# Patient Record
Sex: MALE | Race: BLACK OR AFRICAN AMERICAN | NOT HISPANIC OR LATINO | Employment: OTHER | URBAN - METROPOLITAN AREA
[De-identification: names, ages, dates, MRNs, and addresses within clinical notes are randomized per-mention and may not be internally consistent; named-entity substitution may affect disease eponyms.]

---

## 2023-04-25 LAB
BASOPHILS (10*3/UL) IN BLOOD BY AUTOMATED COUNT: 0 X10E9/L (ref 0–0.1)
BASOPHILS/100 LEUKOCYTES IN BLOOD BY AUTOMATED COUNT: 0 % (ref 0–2)
EOSINOPHILS (10*3/UL) IN BLOOD BY AUTOMATED COUNT: 0 X10E9/L (ref 0–0.4)
EOSINOPHILS/100 LEUKOCYTES IN BLOOD BY AUTOMATED COUNT: 0 % (ref 0–6)
ERYTHROCYTE DISTRIBUTION WIDTH (RATIO) BY AUTOMATED COUNT: 13.4 % (ref 11.5–14.5)
ERYTHROCYTE MEAN CORPUSCULAR HEMOGLOBIN CONCENTRATION (G/DL) BY AUTOMATED: 33.9 G/DL (ref 32–36)
ERYTHROCYTE MEAN CORPUSCULAR VOLUME (FL) BY AUTOMATED COUNT: 83 FL (ref 80–100)
ERYTHROCYTES (10*6/UL) IN BLOOD BY AUTOMATED COUNT: 5.07 X10E12/L (ref 4.5–5.9)
HEMATOCRIT (%) IN BLOOD BY AUTOMATED COUNT: 42.2 % (ref 41–52)
HEMOGLOBIN (G/DL) IN BLOOD: 14.3 G/DL (ref 13.5–17.5)
IGA (MG/DL) IN SER/PLAS: 284 MG/DL (ref 70–400)
IGG (MG/DL) IN SER/PLAS: 1270 MG/DL (ref 700–1600)
IGM (MG/DL) IN SER/PLAS: 66 MG/DL (ref 40–230)
IMMATURE GRANULOCYTES/100 LEUKOCYTES IN BLOOD BY AUTOMATED COUNT: 0.2 % (ref 0–0.9)
LEUKOCYTES (10*3/UL) IN BLOOD BY AUTOMATED COUNT: 5.7 X10E9/L (ref 4.4–11.3)
LYMPHOCYTES (10*3/UL) IN BLOOD BY AUTOMATED COUNT: 1.7 X10E9/L (ref 0.8–3)
LYMPHOCYTES/100 LEUKOCYTES IN BLOOD BY AUTOMATED COUNT: 29.7 % (ref 13–44)
MONOCYTES (10*3/UL) IN BLOOD BY AUTOMATED COUNT: 0.71 X10E9/L (ref 0.05–0.8)
MONOCYTES/100 LEUKOCYTES IN BLOOD BY AUTOMATED COUNT: 12.4 % (ref 2–10)
NEUTROPHILS (10*3/UL) IN BLOOD BY AUTOMATED COUNT: 3.3 X10E9/L (ref 1.6–5.5)
NEUTROPHILS/100 LEUKOCYTES IN BLOOD BY AUTOMATED COUNT: 57.7 % (ref 40–80)
NRBC (PER 100 WBCS) BY AUTOMATED COUNT: 0 /100 WBC (ref 0–0)
PLATELETS (10*3/UL) IN BLOOD AUTOMATED COUNT: 289 X10E9/L (ref 150–450)

## 2023-04-26 LAB
CD19 ABSOLUTE: 0.07 X10E9/L (ref 0.07–0.91)
CD19%: 4 % (ref 6–19)
CD19+CD24++CD38++%: 19 % (ref 1–3.6)
CD19+CD24-CD38++%: 0.9 % (ref 0.6–1.6)
CD19+CD27+IGD+%: 0.4 % (ref 13.4–21.4)
CD19+CD27+IGD-%: 1.3 % (ref 9.2–18.9)
CD19+CD27-IGD+%: 97.6 % (ref 58–72.1)
CD45%: 100 %
FMETH: ABNORMAL
FSIT1: ABNORMAL
MARKER INTERPRETATION: ABNORMAL
PV191: NORMAL

## 2023-09-11 DIAGNOSIS — D84.821 DRUG-INDUCED IMMUNODEFICIENCY (MULTI): Primary | ICD-10-CM

## 2023-09-11 DIAGNOSIS — Z79.899 DRUG-INDUCED IMMUNODEFICIENCY (MULTI): Primary | ICD-10-CM

## 2023-09-11 DIAGNOSIS — G35 MULTIPLE SCLEROSIS (MULTI): ICD-10-CM

## 2023-09-11 RX ORDER — FAMOTIDINE 10 MG/ML
20 INJECTION INTRAVENOUS ONCE AS NEEDED
Status: CANCELLED | OUTPATIENT
Start: 2023-10-24

## 2023-09-11 RX ORDER — EPINEPHRINE 0.3 MG/.3ML
0.3 INJECTION SUBCUTANEOUS EVERY 5 MIN PRN
Status: CANCELLED | OUTPATIENT
Start: 2023-10-24

## 2023-09-11 RX ORDER — DIPHENHYDRAMINE HYDROCHLORIDE 50 MG/ML
50 INJECTION INTRAMUSCULAR; INTRAVENOUS ONCE
Status: CANCELLED | OUTPATIENT
Start: 2023-10-24

## 2023-09-11 RX ORDER — DIPHENHYDRAMINE HYDROCHLORIDE 50 MG/ML
50 INJECTION INTRAMUSCULAR; INTRAVENOUS AS NEEDED
Status: CANCELLED | OUTPATIENT
Start: 2023-10-24

## 2023-09-11 RX ORDER — ALBUTEROL SULFATE 0.83 MG/ML
3 SOLUTION RESPIRATORY (INHALATION) AS NEEDED
Status: CANCELLED | OUTPATIENT
Start: 2023-10-24

## 2023-09-11 RX ORDER — ACETAMINOPHEN 325 MG/1
650 TABLET ORAL ONCE
Status: CANCELLED | OUTPATIENT
Start: 2023-10-24

## 2023-09-11 NOTE — PROGRESS NOTES
John Peterson's disease modifying therapy (DMT) orders transcribed from [facility] into EPIC therapy plan for patient's upcoming dose.    Medication: Ocrelizumab 600 mg IV every 6 months    Prescriber: Joon Treadwell MD    Infusion Location: [facility] ([facility])    Last Infusion: 4/25/2023    Next Infusion Due:  10/22/2023    Next Infusion Scheduled: 10/24/2023    Insurance Status: No precert required    Other: n/a    Abhijeet Llamas, PharmD, BCPS, MSCS  Clinical Pharmacy Specialist- Neurology/MS  [facility]  Phone: [phone]  Fax: [phone]

## 2023-09-26 PROBLEM — R32 URINARY INCONTINENCE: Status: ACTIVE | Noted: 2023-09-26

## 2023-09-26 PROBLEM — R52: Status: ACTIVE | Noted: 2023-09-26

## 2023-09-26 PROBLEM — R29.3 POSTURAL INSTABILITY: Status: ACTIVE | Noted: 2023-09-26

## 2023-09-26 PROBLEM — E78.5 HLD (HYPERLIPIDEMIA): Status: ACTIVE | Noted: 2023-09-26

## 2023-09-26 PROBLEM — R29.898 WEAKNESS OF LEFT LOWER EXTREMITY: Status: ACTIVE | Noted: 2023-09-26

## 2023-09-26 PROBLEM — R27.8 TRUNCAL ATAXIA: Status: ACTIVE | Noted: 2023-09-26

## 2023-09-26 PROBLEM — R43.8 DECREASED SENSE OF SMELL: Status: ACTIVE | Noted: 2023-09-26

## 2023-09-26 PROBLEM — R43.9 SMELL DISTURBANCE: Status: ACTIVE | Noted: 2023-09-26

## 2023-09-26 PROBLEM — G20.C PARKINSONISM (MULTI): Status: ACTIVE | Noted: 2023-09-26

## 2023-09-26 PROBLEM — G31.9 CEREBELLAR ATROPHY (CMS-HCC): Status: ACTIVE | Noted: 2023-09-26

## 2023-09-26 PROBLEM — R14.0 ABDOMINAL DISTENSION: Status: ACTIVE | Noted: 2023-09-26

## 2023-09-26 PROBLEM — H54.3 BLINDNESS, BILATERAL: Status: ACTIVE | Noted: 2023-09-26

## 2023-09-26 PROBLEM — H35.52 RETINITIS PIGMENTOSA: Status: ACTIVE | Noted: 2023-09-26

## 2023-09-26 PROBLEM — G95.9 SPINAL CORD LESION (MULTI): Status: ACTIVE | Noted: 2023-09-26

## 2023-09-26 PROBLEM — R09.A2 FOREIGN BODY SENSATION IN THROAT: Status: ACTIVE | Noted: 2023-09-26

## 2023-09-26 PROBLEM — J39.2 THROAT DRYNESS: Status: ACTIVE | Noted: 2023-09-26

## 2023-09-26 PROBLEM — M79.18 PAIN OF PARASPINAL MUSCLE: Status: ACTIVE | Noted: 2023-09-26

## 2023-09-26 PROBLEM — H40.1190 PRIMARY OPEN ANGLE GLAUCOMA: Status: ACTIVE | Noted: 2023-09-26

## 2023-09-26 PROBLEM — I10 HYPERTENSION: Status: ACTIVE | Noted: 2023-09-26

## 2023-09-26 PROBLEM — R09.82 POSTNASAL DRIP: Status: ACTIVE | Noted: 2023-09-26

## 2023-09-26 RX ORDER — CHOLECALCIFEROL (VITAMIN D3) 125 MCG
125 CAPSULE ORAL DAILY
COMMUNITY
Start: 2023-07-11 | End: 2024-01-24

## 2023-09-26 RX ORDER — BACLOFEN 10 MG/1
TABLET ORAL
COMMUNITY
Start: 2020-08-26 | End: 2024-06-03 | Stop reason: WASHOUT

## 2023-09-26 RX ORDER — SIMETHICONE 125 MG
TABLET,CHEWABLE ORAL
COMMUNITY
Start: 2021-04-01 | End: 2024-06-03 | Stop reason: WASHOUT

## 2023-09-26 RX ORDER — CHLORHEXIDINE GLUCONATE ORAL RINSE 1.2 MG/ML
SOLUTION DENTAL
COMMUNITY
Start: 2023-07-31 | End: 2023-11-02 | Stop reason: ALTCHOICE

## 2023-09-26 RX ORDER — ERGOCALCIFEROL 1.25 MG/1
1 CAPSULE ORAL
COMMUNITY
End: 2024-06-03 | Stop reason: WASHOUT

## 2023-09-26 RX ORDER — PRAVASTATIN SODIUM 20 MG/1
1 TABLET ORAL NIGHTLY
COMMUNITY
Start: 2020-09-30 | End: 2024-04-02

## 2023-09-26 RX ORDER — OMEPRAZOLE 20 MG/1
20 CAPSULE, DELAYED RELEASE ORAL
COMMUNITY
Start: 2023-04-03 | End: 2024-04-02

## 2023-09-26 RX ORDER — AMLODIPINE BESYLATE 10 MG/1
10 TABLET ORAL
COMMUNITY
Start: 2023-04-03 | End: 2024-04-02

## 2023-09-26 RX ORDER — HYDROCHLOROTHIAZIDE 12.5 MG/1
1 TABLET ORAL DAILY
COMMUNITY
Start: 2021-02-24

## 2023-09-26 RX ORDER — AMOXICILLIN 500 MG/1
CAPSULE ORAL
COMMUNITY
Start: 2023-07-31 | End: 2023-11-02 | Stop reason: ALTCHOICE

## 2023-09-26 RX ORDER — OCRELIZUMAB 300 MG/10ML
INJECTION INTRAVENOUS
COMMUNITY
Start: 2020-02-27 | End: 2023-09-28 | Stop reason: WASHOUT

## 2023-09-26 RX ORDER — CARBIDOPA AND LEVODOPA 25; 100 MG/1; MG/1
TABLET ORAL
COMMUNITY
End: 2023-11-02 | Stop reason: SDUPTHER

## 2023-09-26 RX ORDER — IBUPROFEN 800 MG/1
800 TABLET ORAL EVERY 8 HOURS PRN
COMMUNITY
Start: 2023-07-31

## 2023-09-26 RX ORDER — OXYBUTYNIN CHLORIDE 5 MG/1
TABLET ORAL
COMMUNITY
Start: 2016-03-08

## 2023-09-28 DIAGNOSIS — G35 MULTIPLE SCLEROSIS (MULTI): Primary | ICD-10-CM

## 2023-10-02 DIAGNOSIS — D84.821 DRUG-INDUCED IMMUNODEFICIENCY (MULTI): ICD-10-CM

## 2023-10-02 DIAGNOSIS — G35 MULTIPLE SCLEROSIS (MULTI): Primary | ICD-10-CM

## 2023-10-02 DIAGNOSIS — Z79.899 DRUG-INDUCED IMMUNODEFICIENCY (MULTI): ICD-10-CM

## 2023-10-12 ENCOUNTER — APPOINTMENT (OUTPATIENT)
Dept: NEUROLOGY | Facility: CLINIC | Age: 78
End: 2023-10-12
Payer: MEDICARE

## 2023-10-24 ENCOUNTER — INFUSION (OUTPATIENT)
Dept: INFUSION THERAPY | Facility: CLINIC | Age: 78
End: 2023-10-24
Payer: MEDICARE

## 2023-10-24 VITALS
HEART RATE: 82 BPM | TEMPERATURE: 98.1 F | SYSTOLIC BLOOD PRESSURE: 142 MMHG | DIASTOLIC BLOOD PRESSURE: 80 MMHG | RESPIRATION RATE: 16 BRPM | OXYGEN SATURATION: 96 %

## 2023-10-24 DIAGNOSIS — D84.821 DRUG-INDUCED IMMUNODEFICIENCY (MULTI): ICD-10-CM

## 2023-10-24 DIAGNOSIS — G35 MULTIPLE SCLEROSIS (MULTI): ICD-10-CM

## 2023-10-24 DIAGNOSIS — Z79.899 DRUG-INDUCED IMMUNODEFICIENCY (MULTI): ICD-10-CM

## 2023-10-24 LAB
BASOPHILS # BLD AUTO: 0 X10*3/UL (ref 0–0.1)
BASOPHILS NFR BLD AUTO: 0 %
EOSINOPHIL # BLD AUTO: 0 X10*3/UL (ref 0–0.4)
EOSINOPHIL NFR BLD AUTO: 0 %
ERYTHROCYTE [DISTWIDTH] IN BLOOD BY AUTOMATED COUNT: 13.4 % (ref 11.5–14.5)
HCT VFR BLD AUTO: 40.8 % (ref 41–52)
HGB BLD-MCNC: 13.3 G/DL (ref 13.5–17.5)
IGA SERPL-MCNC: 280 MG/DL (ref 70–400)
IGG SERPL-MCNC: 1430 MG/DL (ref 700–1600)
IGM SERPL-MCNC: 35 MG/DL (ref 40–230)
IMM GRANULOCYTES # BLD AUTO: 0.01 X10*3/UL (ref 0–0.5)
IMM GRANULOCYTES NFR BLD AUTO: 0.2 % (ref 0–0.9)
LYMPHOCYTES # BLD AUTO: 1.89 X10*3/UL (ref 0.8–3)
LYMPHOCYTES NFR BLD AUTO: 35.8 %
MCH RBC QN AUTO: 28.1 PG (ref 26–34)
MCHC RBC AUTO-ENTMCNC: 32.6 G/DL (ref 32–36)
MCV RBC AUTO: 86 FL (ref 80–100)
MONOCYTES # BLD AUTO: 0.7 X10*3/UL (ref 0.05–0.8)
MONOCYTES NFR BLD AUTO: 13.3 %
NEUTROPHILS # BLD AUTO: 2.68 X10*3/UL (ref 1.6–5.5)
NEUTROPHILS NFR BLD AUTO: 50.7 %
NRBC BLD-RTO: 0 /100 WBCS (ref 0–0)
PLATELET # BLD AUTO: 288 X10*3/UL (ref 150–450)
PMV BLD AUTO: 10.2 FL (ref 7.5–11.5)
RBC # BLD AUTO: 4.74 X10*6/UL (ref 4.5–5.9)
WBC # BLD AUTO: 5.3 X10*3/UL (ref 4.4–11.3)

## 2023-10-24 PROCEDURE — 96415 CHEMO IV INFUSION ADDL HR: CPT | Performed by: NURSE PRACTITIONER

## 2023-10-24 PROCEDURE — 36415 COLL VENOUS BLD VENIPUNCTURE: CPT

## 2023-10-24 PROCEDURE — 96413 CHEMO IV INFUSION 1 HR: CPT | Performed by: NURSE PRACTITIONER

## 2023-10-24 PROCEDURE — 85025 COMPLETE CBC W/AUTO DIFF WBC: CPT

## 2023-10-24 PROCEDURE — 82784 ASSAY IGA/IGD/IGG/IGM EACH: CPT

## 2023-10-24 PROCEDURE — 96375 TX/PRO/DX INJ NEW DRUG ADDON: CPT | Performed by: NURSE PRACTITIONER

## 2023-10-24 RX ORDER — ACETAMINOPHEN 325 MG/1
650 TABLET ORAL ONCE
Status: CANCELLED | OUTPATIENT
Start: 2023-10-24

## 2023-10-24 RX ORDER — DIPHENHYDRAMINE HYDROCHLORIDE 50 MG/ML
50 INJECTION INTRAMUSCULAR; INTRAVENOUS ONCE
Status: CANCELLED | OUTPATIENT
Start: 2023-10-24

## 2023-10-24 RX ORDER — DIPHENHYDRAMINE HYDROCHLORIDE 50 MG/ML
50 INJECTION INTRAMUSCULAR; INTRAVENOUS AS NEEDED
Status: CANCELLED | OUTPATIENT
Start: 2023-10-24

## 2023-10-24 RX ORDER — ACETAMINOPHEN 325 MG/1
650 TABLET ORAL ONCE
Status: COMPLETED | OUTPATIENT
Start: 2023-10-24 | End: 2023-10-24

## 2023-10-24 RX ORDER — DIPHENHYDRAMINE HYDROCHLORIDE 50 MG/ML
50 INJECTION INTRAMUSCULAR; INTRAVENOUS ONCE
Status: COMPLETED | OUTPATIENT
Start: 2023-10-24 | End: 2023-10-24

## 2023-10-24 RX ORDER — EPINEPHRINE 0.3 MG/.3ML
0.3 INJECTION SUBCUTANEOUS EVERY 5 MIN PRN
Status: CANCELLED | OUTPATIENT
Start: 2023-10-24

## 2023-10-24 RX ORDER — FAMOTIDINE 10 MG/ML
20 INJECTION INTRAVENOUS ONCE AS NEEDED
Status: CANCELLED | OUTPATIENT
Start: 2023-10-24

## 2023-10-24 RX ORDER — ALBUTEROL SULFATE 0.83 MG/ML
3 SOLUTION RESPIRATORY (INHALATION) AS NEEDED
Status: CANCELLED | OUTPATIENT
Start: 2023-10-24

## 2023-10-24 RX ADMIN — DIPHENHYDRAMINE HYDROCHLORIDE 50 MG: 50 INJECTION INTRAMUSCULAR; INTRAVENOUS at 10:59

## 2023-10-24 RX ADMIN — ACETAMINOPHEN 650 MG: 325 TABLET ORAL at 10:59

## 2023-10-24 ASSESSMENT — ENCOUNTER SYMPTOMS
NERVOUS/ANXIOUS: 0
HEMATOLOGIC/LYMPHATIC NEGATIVE: 1
APPETITE CHANGE: 1
LIGHT-HEADEDNESS: 1
CONFUSION: 0
VOMITING: 0
RESPIRATORY NEGATIVE: 1
PALPITATIONS: 1
DEPRESSION: 0
EYE PROBLEMS: 1
BACK PAIN: 1
NAUSEA: 0
DIZZINESS: 1
ABDOMINAL PAIN: 1

## 2023-10-24 NOTE — PROGRESS NOTES
[facility]   infusion Clinic Note   Date: 2023   Name: John Peterson  : 1945   MRN: [id number]         Reason for Visit: OP Infusion (HERE FOR Q6 MONTH OCREVUS 600 MG INFUSION.)       Visit Type: INFUSION     Ordered By: DR. LEWIS    Accompanied by:Self. BROUGHT INTO TREATMENT ROOM BY HOME HELPER (PER PT)      Diagnosis: Multiple sclerosis (CMS/HCC)    Drug-induced immunodeficiency (CMS/HCC)      Allergies:   Allergies as of 10/24/2023    (No Known Allergies)        Current Medications has a current medication list which includes the following prescription(s): amlodipine, amoxicillin, cholecalciferol, ergocalciferol, hydrochlorothiazide, ibuprofen, ocrelizumab, omeprazole, oxybutynin, pravastatin, baclofen, benzalkonium chloride, carbidopa-levodopa, chlorhexidine, ocrelizumab, and simethicone.          Vitals:  Vitals:    10/24/23 1140 10/24/23 1240 10/24/23 1340 10/24/23 1445   BP: 143/75 145/78 142/79 171/74   Pulse: 67 79 74 108   Resp: 16 16 16 16   Temp: 36.6 °C (97.9 °F) 36.7 °C (98 °F) 36.3 °C (97.3 °F) 36.7 °C (98.1 °F)   SpO2: 96% 98% 96% 96%          Infusion Pre-procedure Checklist:   Allergies reviewed: yes   Medications reviewed: yes     Previous reaction to current treatment: No      Assess patient for the concerns below. Document provider notification as appropriate.  - Active or recent infection with/without current antibiotic use: no  - Recent or planned invasive dental work: YES. PT STATES HE HAD A TOOTH REMOVED 1 MONTH AGO.   - Recent or planned surgeries: no  - Recently received or plans to receive vaccinations: no  - Has treatment related toxicities: no  - Is pregnant:  n/a    - Does the patient meet criteria to treat? Yes    Provider notified? No      Pain: 0    Is the pain different from normal: no   Is the pain tolerable: n/a   Is your Doctor aware: yes       Labs: Pending CBC, BCELL, IGG DRAWN TODAY. 10/24/23.      Fall Risk Screening: Limon Fall  "Risk  History of Falling, Immediate or Within 3 Months: No  Secondary Diagnosis: No  Ambulatory Aid: Crutches/cane/walker  Intravenous Therapy/Heparin Lock: No  Gait/Transferring: Weak  Mental Status: Oriented to own ability  Limon Fall Risk Score: 25       Review of Systems   Constitutional:  Positive for appetite change.        PT STATES HE FEELS FULL QUICKLY.    HENT:   Positive for hearing loss and tinnitus.         JEREMIE HEARING LOSS, PT STATES L EAR IS WORSE.    Eyes:  Positive for eye problems.        BLIND IN BOTH EYES.    Respiratory: Negative.     Cardiovascular:  Positive for palpitations.        PT STATES HE SOMETIMES FEELS \" ITCHING INSIDE OF CHEST\".   Gastrointestinal:  Positive for abdominal pain. Negative for nausea and vomiting.        PT ADMITS TO INTERMITTENT STOMACH PAIN. STATES HE FLUCTUATES BETWEEN DIARRHEA AND CONSTIPATION. DENIES N/V.   Genitourinary:          ADMITS TO URGENCY WITH URINATION.    Musculoskeletal:  Positive for back pain.        PT STATES WHOLE BODY FEELS SORE. DENIES ANY SPECIFIC PAIN AT THE MOMENT. STATES HIS RIGHT SHOULDER AND L LEG ACHE.    Skin:  Positive for itching.        ADMITS TO ITCHY SKIN. SKIN VISIBLY DRY.    Neurological:  Positive for dizziness and light-headedness.        ADMITS TO DIZZINESS AND LIGHTHEADED WITH POSITION CHANGES.   N/T IN LETS AND FEET AT TIMES.    Hematological: Negative.    Psychiatric/Behavioral:  Negative for confusion and depression. The patient is not nervous/anxious.          Infusion Readiness:   Assessment Concerns Related to Infusion: No  Provider notified: no      Document Below Only If Indicated:   New Patient Education:  N/A (returning patient for continuation of therapy. Ongoing education provided as needed.)       Drug Specific Questions:  Ocrelizumab  (OCREVUS)  ANY S/S (PML) MEMORY LOSS, TROUBLE THINKING, DIZZINESS, LOSS OF BALANCE, DIFFICULTY TALKING OR WALKING, LOSS OF VISION?  DENIES      Weight Based Drug " Calculations:  WEIGHT BASED DRUGS: NOT APPLICABLE           Initiated By: Estela King RN   Time: 3:37 PM     We administered acetaminophen, methylPREDNISolone sod succinate (PF), diphenhydrAMINE, and ocrelizumab (Ocrevus) 600 mg in sodium chloride 0.9% 520 mL IV.

## 2023-10-24 NOTE — PATIENT INSTRUCTIONS
Today you received: OCREVUS 600 MG     NEXT APPT: 6 MONTHS    For:   1. Multiple sclerosis (CMS/[facility])    2. Drug-induced immunodeficiency (CMS/[facility])          Please read the  Medication Guide that was given to you and reviewed during todays visit.     (Tell all doctors including dentists that you are taking this medication)     Go to the emergency room or call 911 if:  -You have signs of allergic reaction:   o         Rash, hives, itching.   o         Swollen, blistered, peeling skin.   o         Swelling of face, lips, mouth, tongue or throat.   o         Tightness of chest, trouble breathing, swallowing or talking      Call your doctor:     - If IV / injection site gets red, warm, swollen, itchy or leaks fluid or pus.     (Leave dressing on your IV site for at least 2 hours and keep area clean and dry  - If you get sick or have symptoms of infection or are not feeling well for any reason.    (Wash your hands often, stay away from people who are sick)  - If you have side effects from your medication that do not go away or are bothersome.     (Refer to the teaching your nurse gave you for side effects to call your doctor about)     Common side effects may include:  stuffy nose, headache, feeling tired, muscle aches, upset stomach  - Before receiving any vaccines, Call the [facility] at   if:  - You get sick, are on antibiotics, have had a recent vaccine, have surgery or dental work and your doctor wants your visit rescheduled.  - You need to cancel and reschedule your visit for any reason. Call at least 2 days before your visit if you need to cancel.   - Your insurance changes before your next visit.    (We will need to get approval from your new insurance. This can take up to two weeks.)     The [facility] is opened Monday thru Friday. We are closed on weekends and holidays.     Voice mail will take your call if the center is closed. If you leave a message please allow 24 hours for a  call back during weekdays. If you leave a message on a weekend/holiday, we will call you back the next business day.

## 2023-11-02 ENCOUNTER — OFFICE VISIT (OUTPATIENT)
Dept: NEUROLOGY | Facility: HOSPITAL | Age: 78
End: 2023-11-02
Payer: MEDICARE

## 2023-11-02 VITALS
DIASTOLIC BLOOD PRESSURE: 72 MMHG | BODY MASS INDEX: 26.55 KG/M2 | WEIGHT: 196 LBS | SYSTOLIC BLOOD PRESSURE: 135 MMHG | TEMPERATURE: 97.1 F | RESPIRATION RATE: 18 BRPM | HEIGHT: 72 IN | HEART RATE: 60 BPM

## 2023-11-02 DIAGNOSIS — G35 MULTIPLE SCLEROSIS (MULTI): Primary | ICD-10-CM

## 2023-11-02 PROCEDURE — 1159F MED LIST DOCD IN RCRD: CPT | Performed by: PSYCHIATRY & NEUROLOGY

## 2023-11-02 PROCEDURE — 3078F DIAST BP <80 MM HG: CPT | Performed by: PSYCHIATRY & NEUROLOGY

## 2023-11-02 PROCEDURE — 1036F TOBACCO NON-USER: CPT | Performed by: PSYCHIATRY & NEUROLOGY

## 2023-11-02 PROCEDURE — 1126F AMNT PAIN NOTED NONE PRSNT: CPT | Performed by: PSYCHIATRY & NEUROLOGY

## 2023-11-02 PROCEDURE — 3075F SYST BP GE 130 - 139MM HG: CPT | Performed by: PSYCHIATRY & NEUROLOGY

## 2023-11-02 PROCEDURE — 99214 OFFICE O/P EST MOD 30 MIN: CPT | Performed by: PSYCHIATRY & NEUROLOGY

## 2023-11-02 RX ORDER — CARBIDOPA AND LEVODOPA 25; 100 MG/1; MG/1
1.5 TABLET ORAL 3 TIMES DAILY
Qty: 405 TABLET | Refills: 3 | Status: SHIPPED | OUTPATIENT
Start: 2023-11-02 | End: 2024-04-22

## 2023-11-02 ASSESSMENT — PAIN SCALES - GENERAL: PAINLEVEL: 0-NO PAIN

## 2023-11-02 NOTE — PROGRESS NOTES
Diagnoses/Problems  Assessed    · Multiple sclerosis (340) (G35)   · Parkinsonism (332.0) (G20)        Chief Complaint  PD and MS.      History of Present Illness          Provider: Joon Treadwell         Patient name: DEONTE WISE   DMITRIYB: Sep 8 1945   PCP: none      Diagnosis if known: SPMS + parkinsonism   Date of onset: 2015  Date of diagnosis: 08/22/2019  disease course at onset: relapsing  disease course now: progressive without relapses   Current DMT: Ocrevus as of 9/2019  Previous DMTs: none   Last MRI brain: 6/2023  Last MRI cervical: 6/2019  Last MRI thoracic: 6/2019  Last OCT: not done  CSF: cells normal, P 53, positive OCBs  JCV: negative, index 0.2, August 2019  VZV: positive 8/2019  HEP panel: negative 8/2019  NMO: negative July 2019  MOG: negative July 2019        This is a 75 yo right handed AAM with hx of retinitis pigmentosa (legally blind), HTN, and HPL who presents for follow up regarding mild parkinsonism and SPMS     Interval hx:  Reports worsening of slow gait with the walker. Thinks that sinemet is helping some and is open to a higher dose. Reports some tightness in the stomach. MRI from June stable and labs are good.         PD symptom review:      He is a right-handed 76 year-old man with difficulty walking and a tremor in the left arm.   Tremor at rest and postural instability, but no rigidity of movement, no loss of postural reflexes and no frequent falls.   He experiences no noticeable benefit from the medications.  By report, there is good compliance with treatment, good tolerance of treatment and poor symptom control.   Previous medications included: levodopa-carbidopa (Sinemet).    He reports balance problems, but no falls.   He reports no speech problems and no dysphagia.   He reports no constipation . Has chronic increased urgency and frequency.   He reports he has no symptoms of depression and no anxiety.   He reports no difficulty falling asleep, no difficulty staying asleep,  no acting out of dreams and no restless legs . Goes to bed at 10am, up at 12/1am due to having to urinate, then up again at 4am. has vivid dreaming.   He reports no concern about memory, no hallucinations and no delusions . Does not drive due to vision impairment (had to retire in 2007 due to vision). He does not drive   He reports no recent physical therapy and no recent occupational therapy. He exercises.       Review of Systems  All systems reviewed and are negative except as mentioned in the HPI.        Active Problems  Problems    · Abdominal distension (787.3) (R14.0)   · Blindness, bilateral (369.00) (H54.3)   · Couldn't count fingers in either eye.   · Cerebellar atrophy (331.9) (G31.9)   · Decreased sense of smell (781.1) (R43.8)   · Foreign body sensation in throat (784.99) (R09.89)   · HLD (hyperlipidemia) (272.4) (E78.5)   · Hypertension (401.9) (I10)   · Left leg pain (729.5) (M79.605)   · Left shoulder pain (719.41) (M25.512)   · Multiple sclerosis (340) (G35)   · Need for hepatitis B screening test (V73.89) (Z11.59)   · Pain of paraspinal muscle (729.1) (M79.18)   · Parkinsonism (332.0) (G20)   · Postnasal drip (784.91) (R09.82)   · Postural instability (729.90) (R29.3)   · Primary open angle glaucoma (365.11,365.70) (H40.1190)   · Added by Problem List Migration; 2013-12-20   · Retinitis pigmentosa (362.74) (H35.52)   · Diagnosed in 2007.   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   · History of glaucoma (V12.49) (Z86.69)   · History of Need for hepatitis C screening test (V73.89) (Z11.59)   · Resolved Date: 04 Mar 2021   ·  History of Prostate disease (602.9) (N42.9)   · ?Cancer, s/p ?some cryotherapy 2014 ([facility])   · History of Senile nuclear cataract (366.16) (H25.10)   · Added by Problem List Migration; 2013-12-20     Surgical History  Problems    · History of Hernia repair     Family History  Mother    · No pertinent family history  Father    · No pertinent family history  Child    · No pertinent family history  Sibling    · No pertinent family history     Social History  Problems    · Activities of daily living (ADL's), independent   · Lives in a house by himself; able to do thigns at home by himself; Doesn't drive since      2007 due to vision impairment from RP; Brother takes him to the Grocer[employer] store; he can      cook by himself though at home.   · No alcohol use   · Non-smoker (V49.89) (Z78.9)   · Retired   · Was a Dyemaker; last worked in 2007     Allergies  Medication    · No Known Drug Allergies   Recorded By: Rosa Zimmerman; 12/2/2015 11:51:29 AM          Physical Exam  oriented in time, place and person   bilateral severe visual impairment  rest tremor on the left hand , worsened by distraction   bradykinesia R> L   muscle tone: rigidity on activation maneuver   He is barely able to stand without support   strength 4+ grossly on the right , 4- on the left      Scores and Scales     Pyramidal Functions: (2) Minimal disability.   Cerebellar Functions: (3) Moderate truncal or limb ataxia (tremor or clumsy movements interfere with function in all spheres).   Brainstem Functions: (0) Normal.      Sensory Function: (1) Vibration or figure-writing decrease only in one or two limb.                     Bowel and Bladder Function: (1) Mild urinary hesitance, urgency or retention.      Visual Function: (6) Loss of bowel and bladder function.         Cerebral (or Mental) Functions: (0) Normal. 7.5 - Unable to take more than a few steps; restricted to wheelchair; may need aid in transfer; wheels self but cannot carry on in  standard wheelchair a full day; May require motorized wheelchair        Provider Impressions  This is a 74 yo right handed AAM with hx of retinitis pigmentosa (legally blind), HTN, and HPL who presents with difficulty walking that started somewhat acutely in 2015 with LLE weakness followed by progressive? gait decline and resting tremor of the LUE. Exam shows evidence of retinitis pigmentosa on fundus photos, mild hypomimia, mild resting tremor and bradykinesia L>R, 4-/5 LLE weakness, and truncal ataxia. He was admitted to [facility] in 2015 after his initial fall and brain MRI showed no acute findings but was positive for impressive cerebellar atrophy per report. 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 He did not respond to a sufficient trial of sinemet. The combination of retinitis pigmentosa, parkinsonism, cerebellar ataxia/atrophy is suspicious for a heredofamilial condition although the abrupt onset and the patient's advanced age are atypical. Although focal limb weakness can occur in some heredofamilial conditions secondary to amyotrophy, the acute onset of the weakness is definitely atypical and requires more in-depth workup. We will start by MRI of the brain and spinal cord WWO. The DD includes one or more of the following conditions: Atypical SCA or other heredofamilial conditions, Atypical parkinsonism, and cerebrovascular or myelopathic process.     07/01/2019: MRI of the spine showed a non-enhancing T2 hyperintense lesion from T4 to T6 concerning for demyelination. Brain MRI shows mainly non-specific lesions in the deep white matter but it does show a few true periventricular and callosal lesions in perpendicular orientation. Although the same thoracic lesion was read previously as a syrinx at [facility], the fact that he had acute LLE weakness as well as the shape of the cord lesion and the findings of brain MRI are all suspicious for a demyelinating process.  Retinitis pigmentosa has also been linked to optic neuritis in a few cases. Overall, the patient needs full myelopathy and demyelination workup at this point.      07/22/2019: All MS mimics came back negative including negative AQP4 and MOG. Will need an LP for MS rule out.      08/22/2019: CSF came back positive for two or more OCBs. No serum sample submitted so it is unknown if the OCBs are restricted to the CSF, however, since there is low suspicion for a systemic inflammatory disease and since the main differentiation was between spinal demyelination versus syrinx, the positive OCBs here can be interpreted in support of MS. Patient feels that his left leg is getting weaker with time. He is likely in the secondary progressive phase of the disease. Given recent relapse in 2015 and ongoing worsening of leg strength, I prefer to start him on ocrevus for active SPMS. He wants to think about it first.      10/17/2019: Started ocrevus in August with good tolerability. He feels that his leg has gotten heavier after ocrevus but no new symptoms. LLE progressive weakness likely represents disease progression rather than ocrevus side effects. I offered him empiric steroids but he's not interested in this.      01/21/2020: Still feels that his legs are getting heavier and is wondering if ocrevus is causing this. He is questioning whether he should continue to be on ocrevus but I encouraged him to continue taking it to slow down disease progression. He has body pains and skin sensitivity that keeps him from sleeping so I will try him on some gabapentin. Constipation is becoming a bothersome problem so I'll try him on miralax.      03/17/2020: skin sensitivity and constipation improved without having to take gabapentin or miralax. Has not done MRI yet.      04/27/2020 still hasn't done MRI. LLE still getting weaker. He thinks that Ocrevus is causing unpleasant body smell. wants to try sinemet again for her LUE tremor. will  restart sinemet. I encouraged him to continue ocrevus despite the continued progression as ocrevus may still have benefit.      08/26/2020: No benefit from sinemet on tremor or leg stiffness which is getting worse. I will add baclofen and send ocrevus labs.      11/03/2020: tremor and anosmia worsening. still does not want to retry sinemet. ocrevus labs normal. overdue for MRI.      01/18/2021: New brain MRI without new or active lesions. right leg feels heavy. back and legs feel itchy and scratchy. will try him on a small dose of gabapentin (previously had nausea with it).      06/01/2021: gabapentin did not help his itching. legs feel shaky and his balance is getting worse but still unwilling to try sinemet again. No weakness or spasticity of the BLE on exam so unlikely related to MS progression. Labs from Feb unremarkable with lymphopenia or hypogammaglobulinemia. no infections. completed both doses of the Pfizer COVID19 vaccine in March.         9/30/21: Still complains of tremors. He is willing to restart the sinemet. labs show 2% CD19 count in February and persistent lymphopenia at 0.8. Normal Ig levels. He did not mount a humoral response to the COVID19 vaccine and I recommended he takes a booster dose.      11/2/2023: Reports worsening of slow gait with the walker. Thinks that sinemet is helping some and is open to a higher dose. Reports some tightness in the stomach. MRI from June stable and labs are good. Will increase sinemet and continue  ocrevus.      PLAN:  - DMT: continue ocrevus.   - ocrevus labs to be done with each infusion.   - Acute relapse management: not indicated but we can try empiric steroids for him in the future if he agrees.   - Symptomatic: will increase sinemet to 1.5 tabs TID.   - Vitamin D: Continue vitamin D3 51469 UNITS weekly  - Will order brain MRI WWO to be done in June of 2024  - PT/OT: continue PT.   - Follow up in June of 2024 with me or Mari.      The impression and plan  were discussed with the patient and their family (if present) in detail and all questions answered. They agreed to the plan as outlined above.

## 2023-11-02 NOTE — PATIENT INSTRUCTIONS
Your MRI from June was stable without new or active MS lesions, which is great.     Your blood work from last week was also good.     I will increase your carbidopa/levodopa to 1.5 tablet three times daily to see if it will help some of your symptoms.     Please continue the same doses of baclofen and vitamin D.     Repeat brain MRI in June of 2024 then follow up again with me or Mari.     Thank you visiting the clinic today    Joon Treadwell MD

## 2024-01-23 DIAGNOSIS — G35 MULTIPLE SCLEROSIS (MULTI): ICD-10-CM

## 2024-01-24 RX ORDER — CHOLECALCIFEROL (VITAMIN D3) 125 MCG
125 CAPSULE ORAL DAILY
Qty: 90 CAPSULE | Refills: 3 | Status: SHIPPED | OUTPATIENT
Start: 2024-01-24 | End: 2024-04-24 | Stop reason: SDUPTHER

## 2024-03-11 DIAGNOSIS — Z79.899 DRUG-INDUCED IMMUNODEFICIENCY (MULTI): ICD-10-CM

## 2024-03-11 DIAGNOSIS — D84.821 DRUG-INDUCED IMMUNODEFICIENCY (MULTI): ICD-10-CM

## 2024-03-11 DIAGNOSIS — G35 MULTIPLE SCLEROSIS (MULTI): Primary | ICD-10-CM

## 2024-03-11 RX ORDER — ALBUTEROL SULFATE 0.83 MG/ML
3 SOLUTION RESPIRATORY (INHALATION) AS NEEDED
Status: CANCELLED | OUTPATIENT
Start: 2024-04-25

## 2024-03-11 RX ORDER — DIPHENHYDRAMINE HYDROCHLORIDE 50 MG/ML
50 INJECTION INTRAMUSCULAR; INTRAVENOUS AS NEEDED
Status: CANCELLED | OUTPATIENT
Start: 2024-04-25

## 2024-03-11 RX ORDER — EPINEPHRINE 0.3 MG/.3ML
0.3 INJECTION SUBCUTANEOUS EVERY 5 MIN PRN
Status: CANCELLED | OUTPATIENT
Start: 2024-04-25

## 2024-03-11 RX ORDER — ACETAMINOPHEN 325 MG/1
650 TABLET ORAL ONCE
Status: CANCELLED | OUTPATIENT
Start: 2024-04-25

## 2024-03-11 RX ORDER — FAMOTIDINE 10 MG/ML
20 INJECTION INTRAVENOUS ONCE AS NEEDED
Status: CANCELLED | OUTPATIENT
Start: 2024-04-25

## 2024-03-11 RX ORDER — DIPHENHYDRAMINE HYDROCHLORIDE 50 MG/ML
25 INJECTION INTRAMUSCULAR; INTRAVENOUS ONCE
Status: CANCELLED | OUTPATIENT
Start: 2024-04-25

## 2024-04-18 DIAGNOSIS — G35 MULTIPLE SCLEROSIS (MULTI): ICD-10-CM

## 2024-04-22 RX ORDER — CARBIDOPA AND LEVODOPA 25; 100 MG/1; MG/1
TABLET ORAL
Qty: 270 TABLET | Refills: 2 | Status: SHIPPED | OUTPATIENT
Start: 2024-04-22

## 2024-04-24 ENCOUNTER — TELEPHONE (OUTPATIENT)
Dept: NEUROLOGY | Facility: HOSPITAL | Age: 79
End: 2024-04-24
Payer: MEDICARE

## 2024-04-24 DIAGNOSIS — G35 MULTIPLE SCLEROSIS (MULTI): ICD-10-CM

## 2024-04-24 RX ORDER — CHOLECALCIFEROL (VITAMIN D3) 125 MCG
125 CAPSULE ORAL DAILY
Qty: 90 CAPSULE | Refills: 3 | Status: SHIPPED | OUTPATIENT
Start: 2024-04-24

## 2024-04-24 NOTE — TELEPHONE ENCOUNTER
John Peterson called. He would like to request Vit-D be sent to [facility] on file. Please send plenty of refills.

## 2024-04-25 ENCOUNTER — INFUSION (OUTPATIENT)
Dept: INFUSION THERAPY | Facility: CLINIC | Age: 79
End: 2024-04-25
Payer: MEDICARE

## 2024-04-25 VITALS
DIASTOLIC BLOOD PRESSURE: 76 MMHG | TEMPERATURE: 97 F | BODY MASS INDEX: 25.28 KG/M2 | WEIGHT: 186.4 LBS | RESPIRATION RATE: 18 BRPM | SYSTOLIC BLOOD PRESSURE: 145 MMHG | OXYGEN SATURATION: 99 % | HEART RATE: 83 BPM

## 2024-04-25 DIAGNOSIS — G35 MULTIPLE SCLEROSIS (MULTI): ICD-10-CM

## 2024-04-25 DIAGNOSIS — Z79.899 DRUG-INDUCED IMMUNODEFICIENCY (MULTI): ICD-10-CM

## 2024-04-25 DIAGNOSIS — D84.821 DRUG-INDUCED IMMUNODEFICIENCY (MULTI): ICD-10-CM

## 2024-04-25 LAB
BASOPHILS # BLD AUTO: 0.01 X10*3/UL (ref 0–0.1)
BASOPHILS NFR BLD AUTO: 0.2 %
EOSINOPHIL # BLD AUTO: 0 X10*3/UL (ref 0–0.4)
EOSINOPHIL NFR BLD AUTO: 0 %
ERYTHROCYTE [DISTWIDTH] IN BLOOD BY AUTOMATED COUNT: 13.2 % (ref 11.5–14.5)
HCT VFR BLD AUTO: 40 % (ref 41–52)
HGB BLD-MCNC: 13.5 G/DL (ref 13.5–17.5)
IGA SERPL-MCNC: 229 MG/DL (ref 70–400)
IGG SERPL-MCNC: 1230 MG/DL (ref 700–1600)
IGM SERPL-MCNC: 34 MG/DL (ref 40–230)
IMM GRANULOCYTES # BLD AUTO: 0.02 X10*3/UL (ref 0–0.5)
IMM GRANULOCYTES NFR BLD AUTO: 0.3 % (ref 0–0.9)
LYMPHOCYTES # BLD AUTO: 1.81 X10*3/UL (ref 0.8–3)
LYMPHOCYTES NFR BLD AUTO: 28.9 %
MCH RBC QN AUTO: 28.2 PG (ref 26–34)
MCHC RBC AUTO-ENTMCNC: 33.8 G/DL (ref 32–36)
MCV RBC AUTO: 84 FL (ref 80–100)
MONOCYTES # BLD AUTO: 0.67 X10*3/UL (ref 0.05–0.8)
MONOCYTES NFR BLD AUTO: 10.7 %
NEUTROPHILS # BLD AUTO: 3.76 X10*3/UL (ref 1.6–5.5)
NEUTROPHILS NFR BLD AUTO: 59.9 %
NRBC BLD-RTO: 0 /100 WBCS (ref 0–0)
PLATELET # BLD AUTO: 337 X10*3/UL (ref 150–450)
RBC # BLD AUTO: 4.78 X10*6/UL (ref 4.5–5.9)
WBC # BLD AUTO: 6.3 X10*3/UL (ref 4.4–11.3)

## 2024-04-25 PROCEDURE — 88187 FLOWCYTOMETRY/READ 2-8: CPT | Performed by: PSYCHIATRY & NEUROLOGY

## 2024-04-25 PROCEDURE — 85025 COMPLETE CBC W/AUTO DIFF WBC: CPT

## 2024-04-25 PROCEDURE — 96415 CHEMO IV INFUSION ADDL HR: CPT | Performed by: NURSE PRACTITIONER

## 2024-04-25 PROCEDURE — 96413 CHEMO IV INFUSION 1 HR: CPT | Performed by: NURSE PRACTITIONER

## 2024-04-25 PROCEDURE — 96375 TX/PRO/DX INJ NEW DRUG ADDON: CPT | Performed by: NURSE PRACTITIONER

## 2024-04-25 PROCEDURE — 82784 ASSAY IGA/IGD/IGG/IGM EACH: CPT

## 2024-04-25 PROCEDURE — 88184 FLOWCYTOMETRY/ TC 1 MARKER: CPT

## 2024-04-25 PROCEDURE — 88185 FLOWCYTOMETRY/TC ADD-ON: CPT

## 2024-04-25 PROCEDURE — 36415 COLL VENOUS BLD VENIPUNCTURE: CPT

## 2024-04-25 RX ORDER — ACETAMINOPHEN 325 MG/1
650 TABLET ORAL ONCE
Status: COMPLETED | OUTPATIENT
Start: 2024-04-25 | End: 2024-04-25

## 2024-04-25 RX ORDER — FAMOTIDINE 10 MG/ML
20 INJECTION INTRAVENOUS ONCE AS NEEDED
OUTPATIENT
Start: 2024-10-25

## 2024-04-25 RX ORDER — ALBUTEROL SULFATE 0.83 MG/ML
3 SOLUTION RESPIRATORY (INHALATION) AS NEEDED
OUTPATIENT
Start: 2024-10-25

## 2024-04-25 RX ORDER — DIPHENHYDRAMINE HYDROCHLORIDE 50 MG/ML
25 INJECTION INTRAMUSCULAR; INTRAVENOUS ONCE
OUTPATIENT
Start: 2024-10-25

## 2024-04-25 RX ORDER — DIPHENHYDRAMINE HYDROCHLORIDE 50 MG/ML
50 INJECTION INTRAMUSCULAR; INTRAVENOUS AS NEEDED
OUTPATIENT
Start: 2024-10-25

## 2024-04-25 RX ORDER — DIPHENHYDRAMINE HYDROCHLORIDE 50 MG/ML
25 INJECTION INTRAMUSCULAR; INTRAVENOUS ONCE
Status: COMPLETED | OUTPATIENT
Start: 2024-04-25 | End: 2024-04-25

## 2024-04-25 RX ORDER — EPINEPHRINE 0.3 MG/.3ML
0.3 INJECTION SUBCUTANEOUS EVERY 5 MIN PRN
OUTPATIENT
Start: 2024-10-25

## 2024-04-25 RX ORDER — ACETAMINOPHEN 325 MG/1
650 TABLET ORAL ONCE
OUTPATIENT
Start: 2024-10-25

## 2024-04-25 RX ADMIN — DIPHENHYDRAMINE HYDROCHLORIDE 25 MG: 50 INJECTION INTRAMUSCULAR; INTRAVENOUS at 11:11

## 2024-04-25 RX ADMIN — ACETAMINOPHEN 650 MG: 325 TABLET ORAL at 10:55

## 2024-04-25 ASSESSMENT — ENCOUNTER SYMPTOMS
PALPITATIONS: 0
COUGH: 0
NAUSEA: 0
ARTHRALGIAS: 1
CHILLS: 0
UNEXPECTED WEIGHT CHANGE: 0
HEADACHES: 0
TROUBLE SWALLOWING: 0
SORE THROAT: 0
CONSTIPATION: 1
DIZZINESS: 0
FEVER: 0
DEPRESSION: 0
DYSURIA: 0
FREQUENCY: 0
VOMITING: 0
LIGHT-HEADEDNESS: 0
DIARRHEA: 0
EXTREMITY WEAKNESS: 0
SHORTNESS OF BREATH: 0
NERVOUS/ANXIOUS: 0
NUMBNESS: 0
ABDOMINAL PAIN: 0
APPETITE CHANGE: 0
VOICE CHANGE: 0
HEMATURIA: 0
WHEEZING: 0
FATIGUE: 0
WOUND: 0
LEG SWELLING: 0
BLOOD IN STOOL: 0
EYE PROBLEMS: 1

## 2024-04-25 NOTE — PROGRESS NOTES
[facility]   Infusion Clinic Note   Date: 2024   Name: John Peterson  : 1945   MRN: [id number]         Reason for Visit: Follow-up and OP Infusion (PATIENT IS HERE FOR OCREVUS 600 MG INFUSION EVERY 6 MONTHS.)         Visit Type: INFUSION       Ordered By:  DR. LEWIS      Accompanied by:FRIEND      Diagnosis: Multiple sclerosis (Multi)    Drug-induced immunodeficiency (Multi)       Allergies:   Allergies as of 2024    (No Known Allergies)         Current Medications has a current medication list which includes the following prescription(s): baclofen, carbidopa-levodopa, cholecalciferol, ergocalciferol, hydrochlorothiazide, ibuprofen, ocrelizumab, oxybutynin, amlodipine, benzalkonium chloride, omeprazole, pravastatin, and simethicone.       Vitals:   Vitals:    24 1001 24 1215 24 1249 24 1359   BP: 159/73 136/71 147/76 149/77   Pulse: 68 63 75 77   Resp: 17 17 17 17   Temp: 36.1 °C (96.9 °F) 36.8 °C (98.3 °F) 36.8 °C (98.2 °F) 35.9 °C (96.6 °F)   TempSrc:  Temporal Temporal    SpO2: 96% 96% 96% 97%   Weight: 84.5 kg (186 lb 6.4 oz)       24 1500   BP: 145/76   Pulse: 83   Resp: 18   Temp: 36.1 °C (97 °F)   TempSrc:    SpO2: 99%   Weight:              Infusion Pre-procedure Checklist:   - Allergies reviewed: yes   - Medications reviewed: yes       - Previous reaction to current treatment: no      Assess patient for the concerns below. Document provider notification as appropriate.  - Active or recent infection with/without current antibiotic use: yes FOR DENTAL WORK. OK PER DR. LEWIS TO ADMINISTER INFUSION.  - Recent or planned invasive dental work: yes DENTAL IMPLANTS IN   - Recent or planned surgeries: no  - Recently received or plans to receive vaccinations: no  - Has treatment related toxicities: no  - Is pregnant:  n/a      Pain: 1 MOUTH IS SORE FROM DENTAL WORK   - Is the pain different from normal: no   - Is the pain  tolerable: yes   - Is your Doctor aware:  yes      Labs: Labs drawn and sent per order         Fall Risk Screening: Limon Fall Risk  History of Falling, Immediate or Within 3 Months: No  Secondary Diagnosis: No  Ambulatory Aid: Crutches/cane/walker (USES ROLLATOR)  Intravenous Therapy/Heparin Lock: Yes  Gait/Transferring: Weak  Mental Status: Oriented to own ability  Limon Fall Risk Score: 45       Review Of Systems:  Review of Systems   Constitutional:  Negative for appetite change, chills, fatigue, fever and unexpected weight change.   HENT:   Positive for hearing loss. Negative for mouth sores, sore throat, tinnitus, trouble swallowing and voice change.    Eyes:  Positive for eye problems.        PATIENT IS BLIND   Respiratory:  Negative for cough, shortness of breath and wheezing.    Cardiovascular:  Negative for chest pain, leg swelling and palpitations.   Gastrointestinal:  Positive for constipation. Negative for abdominal pain, blood in stool, diarrhea, nausea and vomiting.   Genitourinary:  Negative for dysuria, frequency and hematuria.    Musculoskeletal:  Positive for arthralgias.        BOTH LEGS AND SHOULDER ARTHRITIS   Skin:  Positive for itching. Negative for rash and wound.        BACK, CHEST, ITCHING R/T ANXIETY   Neurological:  Negative for dizziness, extremity weakness, headaches, light-headedness and numbness.   Psychiatric/Behavioral:  Negative for depression. The patient is not nervous/anxious.          Infusion Readiness:   - Assessment Concerns Related to Infusion: Yes  - Provider notified: yes      Document Below Only If Indicated:   New Patient Education:    N/A (returning patient for continuation of therapy. Ongoing education provided as needed.)        Treatment Conditions & Drug Specific Questions:    Ocrelizumab  (OCREVUS)    (Unless otherwise specified on patient specific therapy plan):     TREATMENT CONDITIONS:  Unless otherwise specified on patient specific therapy plan HOLD and  notify provider prior to proceeding with today's infusion if patient with:  o Positive Hepatitis B Surface Ag or panel  o Positive T-Spot    Lab Results   Component Value Date    TBSIN Negative 08/22/2019      Lab Results   Component Value Date    HEPBSAG NONREACTIVE 08/22/2019      Lab Results   Component Value Date    HEPBCAB NONREACTIVE 08/22/2019    HEPCAB NONREACTIVE 03/03/2021        Labs reviewed and patient meets treatment conditions? Yes    DRUG SPECIFIC QUESTIONS:  Any signs or symptoms of Progressive multifocal leukoencephalopathy (PML) which may include: memory loss, trouble thinking, dizziness, loss of balance, difficulty talking / walking or loss of vision? No    **PATIENT IS BLIND    (If YES HOLD and notify provider)      REMINDER:  Recommended Vitals/Observation:  Vitals: Obtain vital signs every 30 minutes / with each ramp up. Once maximum rate is reached obtain vitals hourly until infusion is complete. Obtain vitals at end of observation period and PRN.  Observation: Observe patient for 60 minutes after each infusion.        Weight Based Drug Calculations:    WEIGHT BASED DRUGS: NOT APPLICABLE / FLAT DOSE          Initiated By: Reena Baeza RN   Time: 4:15 PM     We administered acetaminophen, methylPREDNISolone sod succinate, diphenhydrAMINE, and ocrelizumab (Ocrevus) 600 mg in sodium chloride 0.9% 520 mL IV.

## 2024-04-25 NOTE — PATIENT INSTRUCTIONS
Today :We administered acetaminophen, methylPREDNISolone sod succinate, diphenhydrAMINE, and ocrelizumab (Ocrevus) 600 mg in sodium chloride 0.9% 520 mL IV.     For:   1. Multiple sclerosis (Multi)    2. Drug-induced immunodeficiency (Multi)         Your next appointment is due in:  6 MONTHS.     Please read the  Medication Guide that was given to you and reviewed during todays visit.     (Tell all doctors including dentists that you are taking this medication)     Go to the emergency room or call 911 if:  -You have signs of allergic reaction:   -Rash, hives, itching.   -Swollen, blistered, peeling skin.   -Swelling of face, lips, mouth, tongue or throat.   -Tightness of chest, trouble breathing, swallowing or talking     Call your doctor:  - If IV / injection site gets red, warm, swollen, itchy or leaks fluid or pus.     (Leave dressing on your IV site for at least 2 hours and keep area clean and dry  - If you get sick or have symptoms of infection or are not feeling well for any reason.    (Wash your hands often, stay away from people who are sick)  - If you have side effects from your medication that do not go away or are bothersome.     (Refer to the teaching your nurse gave you for side effects to call your doctor about)    - Common side effects may include:  stuffy nose, headache, feeling tired, muscle aches, upset stomach  - Before receiving any vaccines     - Call the [facility] at   If:  - You get sick, are on antibiotics, have had a recent vaccine, have surgery or dental work and your doctor wants your visit rescheduled.  - You need to cancel and reschedule your visit for any reason. Call at least 2 days before your visit if you need to cancel.   - Your insurance changes before your next visit.    (We will need to get approval from your new insurance. This can take up to two weeks.)     The [facility] is opened Monday thru Friday. We are closed on weekends and holidays.    Voice mail will take your call if the center is closed. If you leave a message please allow 24 hours for a call back during weekdays. If you leave a message on a weekend/holiday, we will call you back the next business day.

## 2024-04-30 LAB
CD19 CELLS # BLD: 0.02 X10E9/L
CD19 CELLS NFR BLD: 1 %
CD19+CD24++CD38++%: 49.1 %
CD19+CD24-CD38++%: 2.7 %
CD19+CD27+IGD+%: 3.3 %
CD19+CD27+IGD-%: 5.1 %
CD19+CD27-IGD+%: 89.4 %
FLOW CYTOMETRY SPECIALIST REVIEW: ABNORMAL
LYMPHOCYTES # SPEC AUTO: 1.81 X10*3/UL
PATH REVIEW, B CELL PHENOTYPING, EXTENDED: ABNORMAL

## 2024-05-16 ENCOUNTER — APPOINTMENT (OUTPATIENT)
Dept: NEUROLOGY | Facility: CLINIC | Age: 79
End: 2024-05-16
Payer: MEDICARE

## 2024-06-03 ENCOUNTER — OFFICE VISIT (OUTPATIENT)
Dept: NEUROLOGY | Facility: CLINIC | Age: 79
End: 2024-06-03
Payer: MEDICARE

## 2024-06-03 VITALS
DIASTOLIC BLOOD PRESSURE: 60 MMHG | HEART RATE: 60 BPM | SYSTOLIC BLOOD PRESSURE: 118 MMHG | BODY MASS INDEX: 25.23 KG/M2 | WEIGHT: 186 LBS | RESPIRATION RATE: 18 BRPM

## 2024-06-03 DIAGNOSIS — G35 MULTIPLE SCLEROSIS (MULTI): Primary | ICD-10-CM

## 2024-06-03 PROCEDURE — 99215 OFFICE O/P EST HI 40 MIN: CPT | Performed by: NURSE PRACTITIONER

## 2024-06-03 PROCEDURE — 3074F SYST BP LT 130 MM HG: CPT | Performed by: NURSE PRACTITIONER

## 2024-06-03 PROCEDURE — 1125F AMNT PAIN NOTED PAIN PRSNT: CPT | Performed by: NURSE PRACTITIONER

## 2024-06-03 PROCEDURE — 3078F DIAST BP <80 MM HG: CPT | Performed by: NURSE PRACTITIONER

## 2024-06-03 PROCEDURE — 1159F MED LIST DOCD IN RCRD: CPT | Performed by: NURSE PRACTITIONER

## 2024-06-03 PROCEDURE — 1160F RVW MEDS BY RX/DR IN RCRD: CPT | Performed by: NURSE PRACTITIONER

## 2024-06-03 ASSESSMENT — PAIN SCALES - GENERAL: PAINLEVEL: 5

## 2024-06-04 ASSESSMENT — ENCOUNTER SYMPTOMS: SMELL DISTURBANCE: 1

## 2024-07-16 ENCOUNTER — APPOINTMENT (OUTPATIENT)
Dept: ORTHOPEDIC SURGERY | Facility: HOSPITAL | Age: 79
End: 2024-07-16
Payer: MEDICARE

## 2024-07-19 ENCOUNTER — HOSPITAL ENCOUNTER (OUTPATIENT)
Dept: RADIOLOGY | Facility: HOSPITAL | Age: 79
Discharge: HOME | End: 2024-07-19
Payer: MEDICARE

## 2024-07-19 ENCOUNTER — OFFICE VISIT (OUTPATIENT)
Dept: ORTHOPEDIC SURGERY | Facility: HOSPITAL | Age: 79
End: 2024-07-19
Payer: MEDICARE

## 2024-07-19 ENCOUNTER — TELEPHONE (OUTPATIENT)
Dept: HOME HEALTH SERVICES | Facility: HOME HEALTH | Age: 79
End: 2024-07-19

## 2024-07-19 ENCOUNTER — HOME HEALTH ADMISSION (OUTPATIENT)
Dept: HOME HEALTH SERVICES | Facility: HOME HEALTH | Age: 79
End: 2024-07-19
Payer: MEDICARE

## 2024-07-19 DIAGNOSIS — M25.511 ACUTE PAIN OF RIGHT SHOULDER: ICD-10-CM

## 2024-07-19 DIAGNOSIS — M19.011 LOCALIZED OSTEOARTHRITIS OF RIGHT SHOULDER: Primary | ICD-10-CM

## 2024-07-19 DIAGNOSIS — M25.819 SHOULDER IMPINGEMENT: ICD-10-CM

## 2024-07-19 PROCEDURE — 73030 X-RAY EXAM OF SHOULDER: CPT | Mod: RIGHT SIDE | Performed by: RADIOLOGY

## 2024-07-19 PROCEDURE — 99203 OFFICE O/P NEW LOW 30 MIN: CPT

## 2024-07-19 PROCEDURE — 73030 X-RAY EXAM OF SHOULDER: CPT | Mod: RT

## 2024-07-19 PROCEDURE — 99213 OFFICE O/P EST LOW 20 MIN: CPT

## 2024-07-19 NOTE — PROGRESS NOTES
TimingHPI:  John Peterson is a blind 78-year-old male who presents today with right shoulder pain for the past few months.  She denies any mechanism of injury.  He states his pain is worse with overhead movement.  He describes some weakness with overhead movement.  He has a history of MS.  He denies radicular pain, denies numbness and tingling.  He has not done physical therapy.    ROS:  Reviewed on EMR and patient intake sheet.    PMH/SH:  Reviewed on EMR and patient intake sheet.    Exam:  MSK: No obvious bony deformity, asymmetry of shoulders on physical examination.  4 -/5 strength of right upper extremity.  Mild pain to shoulder abduction past 90 degrees.  Mild pain to shoulder flexion past 90 degrees.  Negative empty can test, mild pain to Shah and Neer's test.  General: No acute distress. Awake and conversant.  Eyes: Normal conjunctiva, anicteric. Round symmetric pupils.  ENT: Hearing grossly intact. No nasal discharge.  Neck: Neck is supple. No masses or thyromegaly.  Respiratory: Respirations are non-labored. No wheezing.  Skin: Warm. No rashes or ulcers.  Psych: Alert and oriented. Cooperative, appropriate mood and affect, normal judgement.  CV: No lower extremity edema.  Neuro: Sensation and CN II-XII grossly normal.    Radiology:     X-rays personally reviewed and demonstrate joint space narrowing of right shoulder.  Mild to moderate osteoarthritis.  No acute fractures or dislocations.    Diagnosis:    Osteoarthritis, right shoulder  Impingement, right shoulder    Assessment and Plan:  Patient was seen today and evaluated for right shoulder pain that has been present for the past few months. At this time, I recommended PT and OTC pain medications. I discussed with the health aide today that home PT may be an option due to the patient's blindness. He should follow up with shoulder specialist if his pain worsens over the next few months. Patient feels questions were answered today. Patient agrees to the  plan above.    Minor Raymundo PA-C  Department of Orthopaedic Surgery  2:01 PM  07/19/24    [id number] [place] [address]    Voicemail: [phone]   Appts: [phone]  Fax: [phone]

## 2024-07-19 NOTE — TELEPHONE ENCOUNTER
July Gaxiola & Jovita,    Thank you for your referral to  Home Care. At this time, we are unable to accommodate your patient's needs in a timely manner. The earliest availability we have for Start-of-Care would be 7/23-7/24. If this is acceptable, please respond to this Telephone Encounter and we will proceed with the referral.     If you would prefer to refer the patient to another agency, feel free to contact one of these agencies in Mr. Peterson's service area, or an alternate of your choice, to see if they are able to accept your patient:         [facility] - P: [phone]; F: [phone]            [facility] - P: [phone]; F:[phone]         Please let us know how you would like to proceed.    Thank you,  [facility] Intake

## 2024-07-22 ENCOUNTER — DOCUMENTATION (OUTPATIENT)
Dept: HOME HEALTH SERVICES | Facility: HOME HEALTH | Age: 79
End: 2024-07-22
Payer: MEDICARE

## 2024-07-22 NOTE — HH CARE COORDINATION
Home Care received a Referral for Physical Therapy and Occupational Therapy. We have processed the referral for a Start of Care on 7/23-7/24.     If you have any questions or concerns, please feel free to contact us at [phone]. Follow the prompts, enter your five digit zip code, and you will be directed to your care team on CENTL 1.

## 2024-07-24 ENCOUNTER — HOME CARE VISIT (OUTPATIENT)
Dept: HOME HEALTH SERVICES | Facility: HOME HEALTH | Age: 79
End: 2024-07-24
Payer: MEDICARE

## 2024-07-24 VITALS
HEIGHT: 72 IN | BODY MASS INDEX: 23.98 KG/M2 | HEART RATE: 67 BPM | SYSTOLIC BLOOD PRESSURE: 109 MMHG | WEIGHT: 177 LBS | DIASTOLIC BLOOD PRESSURE: 63 MMHG

## 2024-07-24 PROCEDURE — G0151 HHCP-SERV OF PT,EA 15 MIN: HCPCS | Mod: HHH

## 2024-07-24 PROCEDURE — 169592 NO-PAY CLAIM PROCEDURE

## 2024-07-24 ASSESSMENT — ENCOUNTER SYMPTOMS
PERSON REPORTING PAIN: PATIENT
LOWEST PAIN SEVERITY IN PAST 24 HOURS: 2/10
PAIN: 1
HIGHEST PAIN SEVERITY IN PAST 24 HOURS: 4/10

## 2024-07-25 ENCOUNTER — HOME CARE VISIT (OUTPATIENT)
Dept: HOME HEALTH SERVICES | Facility: HOME HEALTH | Age: 79
End: 2024-07-25
Payer: MEDICARE

## 2024-07-25 PROCEDURE — G0152 HHCP-SERV OF OT,EA 15 MIN: HCPCS | Mod: HHH

## 2024-07-25 ASSESSMENT — ACTIVITIES OF DAILY LIVING (ADL)
TOILETING: 1
PHYSICAL TRANSFERS ASSESSED: 1
DRESSING_LB_CURRENT_FUNCTION: INDEPENDENT
DRESSING_UB_CURRENT_FUNCTION: INDEPENDENT
FEEDING: INDEPENDENT
FEEDING ASSESSED: 1
TOILETING: INDEPENDENT
PREPARING MEALS: INDEPENDENT
CURRENT_FUNCTION: INDEPENDENT

## 2024-07-26 ASSESSMENT — ENCOUNTER SYMPTOMS
MUSCLE WEAKNESS: 1
OCCASIONAL FEELINGS OF UNSTEADINESS: 0
LIMITED RANGE OF MOTION: 1

## 2024-07-26 ASSESSMENT — ACTIVITIES OF DAILY LIVING (ADL)
OASIS_M1830: 02
ENTERING_EXITING_HOME: MODERATE ASSIST

## 2024-07-29 ENCOUNTER — HOME CARE VISIT (OUTPATIENT)
Dept: HOME HEALTH SERVICES | Facility: HOME HEALTH | Age: 79
End: 2024-07-29
Payer: MEDICARE

## 2024-07-29 PROCEDURE — G0152 HHCP-SERV OF OT,EA 15 MIN: HCPCS | Mod: HHH

## 2024-07-29 ASSESSMENT — ENCOUNTER SYMPTOMS
HIGHEST PAIN SEVERITY IN PAST 24 HOURS: 3/10
PAIN LOCATION: RIGHT SHOULDER
LOWEST PAIN SEVERITY IN PAST 24 HOURS: 0/10
PAIN: 1
PERSON REPORTING PAIN: PATIENT

## 2024-07-31 ENCOUNTER — HOME CARE VISIT (OUTPATIENT)
Dept: HOME HEALTH SERVICES | Facility: HOME HEALTH | Age: 79
End: 2024-07-31
Payer: MEDICARE

## 2024-07-31 PROCEDURE — G0152 HHCP-SERV OF OT,EA 15 MIN: HCPCS | Mod: HHH

## 2024-07-31 ASSESSMENT — ENCOUNTER SYMPTOMS
PERSON REPORTING PAIN: PATIENT
DENIES PAIN: 1

## 2024-08-03 ENCOUNTER — HOME CARE VISIT (OUTPATIENT)
Dept: HOME HEALTH SERVICES | Facility: HOME HEALTH | Age: 79
End: 2024-08-03
Payer: MEDICARE

## 2024-08-06 ENCOUNTER — HOME CARE VISIT (OUTPATIENT)
Dept: HOME HEALTH SERVICES | Facility: HOME HEALTH | Age: 79
End: 2024-08-06
Payer: MEDICARE

## 2024-08-06 PROCEDURE — G0152 HHCP-SERV OF OT,EA 15 MIN: HCPCS | Mod: HHH

## 2024-08-07 ENCOUNTER — HOME CARE VISIT (OUTPATIENT)
Dept: HOME HEALTH SERVICES | Facility: HOME HEALTH | Age: 79
End: 2024-08-07
Payer: MEDICARE

## 2024-08-09 ENCOUNTER — HOME CARE VISIT (OUTPATIENT)
Dept: HOME HEALTH SERVICES | Facility: HOME HEALTH | Age: 79
End: 2024-08-09
Payer: MEDICARE

## 2024-08-14 ENCOUNTER — HOME CARE VISIT (OUTPATIENT)
Dept: HOME HEALTH SERVICES | Facility: HOME HEALTH | Age: 79
End: 2024-08-14
Payer: MEDICARE

## 2024-08-14 PROCEDURE — G0152 HHCP-SERV OF OT,EA 15 MIN: HCPCS | Mod: HHH

## 2024-08-14 ASSESSMENT — ACTIVITIES OF DAILY LIVING (ADL)
HOME_HEALTH_OASIS: 01
OASIS_M1830: 00

## 2024-09-05 ENCOUNTER — HOME HEALTH ADMISSION (OUTPATIENT)
Dept: HOME HEALTH SERVICES | Facility: HOME HEALTH | Age: 79
End: 2024-09-05
Payer: MEDICARE

## 2024-09-05 ENCOUNTER — OFFICE VISIT (OUTPATIENT)
Dept: NEUROLOGY | Facility: CLINIC | Age: 79
End: 2024-09-05
Payer: MEDICARE

## 2024-09-05 VITALS
BODY MASS INDEX: 24.01 KG/M2 | SYSTOLIC BLOOD PRESSURE: 127 MMHG | WEIGHT: 177 LBS | DIASTOLIC BLOOD PRESSURE: 66 MMHG | HEART RATE: 71 BPM | RESPIRATION RATE: 18 BRPM

## 2024-09-05 DIAGNOSIS — G35 MULTIPLE SCLEROSIS (MULTI): Primary | ICD-10-CM

## 2024-09-05 PROCEDURE — 3078F DIAST BP <80 MM HG: CPT | Performed by: NURSE PRACTITIONER

## 2024-09-05 PROCEDURE — 99215 OFFICE O/P EST HI 40 MIN: CPT | Performed by: NURSE PRACTITIONER

## 2024-09-05 PROCEDURE — 1036F TOBACCO NON-USER: CPT | Performed by: NURSE PRACTITIONER

## 2024-09-05 PROCEDURE — 3074F SYST BP LT 130 MM HG: CPT | Performed by: NURSE PRACTITIONER

## 2024-09-05 PROCEDURE — 1159F MED LIST DOCD IN RCRD: CPT | Performed by: NURSE PRACTITIONER

## 2024-09-05 PROCEDURE — 1126F AMNT PAIN NOTED NONE PRSNT: CPT | Performed by: NURSE PRACTITIONER

## 2024-09-05 ASSESSMENT — PAIN SCALES - GENERAL: PAINLEVEL: 0-NO PAIN
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
## 2024-09-06 ENCOUNTER — DOCUMENTATION (OUTPATIENT)
Dept: HOME HEALTH SERVICES | Facility: HOME HEALTH | Age: 79
End: 2024-09-06
Payer: MEDICARE

## 2024-09-06 NOTE — HH CARE COORDINATION
Home Care received a Referral for Physical Therapy, Occupational Therapy, Home Health Aide, Medical Social Work, and Registered Dietician. We have processed the referral for a Start of Care on 9/8-9/9/24.     If you have any questions or concerns, please feel free to contact us at [phone]. Follow the prompts, enter your five digit zip code, and you will be directed to your care team on CENTL 1.

## 2024-09-09 ENCOUNTER — APPOINTMENT (OUTPATIENT)
Dept: RADIOLOGY | Facility: CLINIC | Age: 79
End: 2024-09-09
Payer: MEDICARE

## 2024-09-10 ENCOUNTER — HOME CARE VISIT (OUTPATIENT)
Dept: HOME HEALTH SERVICES | Facility: HOME HEALTH | Age: 79
End: 2024-09-10
Payer: MEDICARE

## 2024-09-10 PROCEDURE — G0151 HHCP-SERV OF PT,EA 15 MIN: HCPCS | Mod: HHH

## 2024-09-10 PROCEDURE — 169592 NO-PAY CLAIM PROCEDURE

## 2024-09-10 ASSESSMENT — ACTIVITIES OF DAILY LIVING (ADL): ENTERING_EXITING_HOME: MODERATE ASSIST

## 2024-09-11 ENCOUNTER — HOSPITAL ENCOUNTER (OUTPATIENT)
Dept: RADIOLOGY | Facility: CLINIC | Age: 79
Discharge: HOME | End: 2024-09-11
Payer: MEDICARE

## 2024-09-11 ENCOUNTER — HOME CARE VISIT (OUTPATIENT)
Dept: HOME HEALTH SERVICES | Facility: HOME HEALTH | Age: 79
End: 2024-09-11
Payer: MEDICARE

## 2024-09-11 VITALS
DIASTOLIC BLOOD PRESSURE: 88 MMHG | SYSTOLIC BLOOD PRESSURE: 123 MMHG | HEIGHT: 78 IN | TEMPERATURE: 97.8 F | WEIGHT: 170 LBS | BODY MASS INDEX: 19.67 KG/M2 | HEART RATE: 64 BPM

## 2024-09-11 DIAGNOSIS — G35 MULTIPLE SCLEROSIS (MULTI): ICD-10-CM

## 2024-09-11 PROCEDURE — 70553 MRI BRAIN STEM W/O & W/DYE: CPT

## 2024-09-11 PROCEDURE — 2550000001 HC RX 255 CONTRASTS: Performed by: NURSE PRACTITIONER

## 2024-09-11 PROCEDURE — G0152 HHCP-SERV OF OT,EA 15 MIN: HCPCS | Mod: HHH

## 2024-09-11 PROCEDURE — 70553 MRI BRAIN STEM W/O & W/DYE: CPT | Performed by: RADIOLOGY

## 2024-09-11 PROCEDURE — A9575 INJ GADOTERATE MEGLUMI 0.1ML: HCPCS | Performed by: NURSE PRACTITIONER

## 2024-09-11 RX ORDER — GADOTERATE MEGLUMINE 376.9 MG/ML
15 INJECTION INTRAVENOUS
Status: COMPLETED | OUTPATIENT
Start: 2024-09-11 | End: 2024-09-11

## 2024-09-11 ASSESSMENT — ENCOUNTER SYMPTOMS: DENIES PAIN: 1

## 2024-09-12 ENCOUNTER — HOME CARE VISIT (OUTPATIENT)
Dept: HOME HEALTH SERVICES | Facility: HOME HEALTH | Age: 79
End: 2024-09-12
Payer: MEDICARE

## 2024-09-12 PROCEDURE — G0270 MNT SUBS TX FOR CHANGE DX: HCPCS | Mod: HHH

## 2024-09-12 SDOH — HEALTH STABILITY: MENTAL HEALTH: NUTRITION HISTORY: REG DIET, DECERASED APPETITE OVER LAST MONTH

## 2024-09-12 ASSESSMENT — ACTIVITIES OF DAILY LIVING (ADL)
TOILETING: 1
BATHING ASSESSED: 1
BATHING_CURRENT_FUNCTION: MODERATE ASSIST
TOILETING: SUPERVISION

## 2024-09-12 NOTE — CASE COMMUNICATION
79 y.o,. male living with Parkinson's d. and blindness living with daughter who cues throughout all ADL routines and mobility to reduce cognitive and spatial confusion with tasks that were routinely performed without cues is seeking assistance of OT to recommend environmental modifications to halp patient oriient self to previously familiar spaces. Recommended shower transfer bench and additional grab bar along entry wall of bathroom, e xtra hnd holds at stairs and kitchen to reduce risk of injury and help patient orient to space. Patient follows verbal and tactile cues given extra time. Caregiver willing to follow up on safety measures recommended, Skilled Ot to complete vision accomodations to restroe showering and dressing routines to reduce fall risk and confusion and functional mobility to reduce time to safely exit the home. 1w1 2w2 1w1

## 2024-09-14 ASSESSMENT — ENCOUNTER SYMPTOMS
OCCASIONAL FEELINGS OF UNSTEADINESS: 1
MUSCLE WEAKNESS: 1

## 2024-09-14 ASSESSMENT — ACTIVITIES OF DAILY LIVING (ADL): OASIS_M1830: 03

## 2024-09-17 ENCOUNTER — HOME CARE VISIT (OUTPATIENT)
Dept: HOME HEALTH SERVICES | Facility: HOME HEALTH | Age: 79
End: 2024-09-17
Payer: MEDICARE

## 2024-09-17 ENCOUNTER — OFFICE VISIT (OUTPATIENT)
Dept: NEUROLOGY | Facility: HOSPITAL | Age: 79
End: 2024-09-17
Payer: MEDICARE

## 2024-09-17 ENCOUNTER — TELEPHONE (OUTPATIENT)
Dept: NEUROLOGY | Facility: CLINIC | Age: 79
End: 2024-09-17

## 2024-09-17 VITALS — DIASTOLIC BLOOD PRESSURE: 72 MMHG | RESPIRATION RATE: 18 BRPM | HEART RATE: 70 BPM | SYSTOLIC BLOOD PRESSURE: 122 MMHG

## 2024-09-17 VITALS
DIASTOLIC BLOOD PRESSURE: 69 MMHG | BODY MASS INDEX: 21.82 KG/M2 | HEART RATE: 67 BPM | RESPIRATION RATE: 18 BRPM | HEIGHT: 74 IN | WEIGHT: 170 LBS | SYSTOLIC BLOOD PRESSURE: 124 MMHG | TEMPERATURE: 97.8 F

## 2024-09-17 DIAGNOSIS — G35 MULTIPLE SCLEROSIS (MULTI): Primary | ICD-10-CM

## 2024-09-17 PROCEDURE — 3074F SYST BP LT 130 MM HG: CPT | Performed by: PSYCHIATRY & NEUROLOGY

## 2024-09-17 PROCEDURE — 1036F TOBACCO NON-USER: CPT | Performed by: PSYCHIATRY & NEUROLOGY

## 2024-09-17 PROCEDURE — 3078F DIAST BP <80 MM HG: CPT | Performed by: PSYCHIATRY & NEUROLOGY

## 2024-09-17 PROCEDURE — G0157 HHC PT ASSISTANT EA 15: HCPCS | Mod: CQ,HHH

## 2024-09-17 PROCEDURE — 99212 OFFICE O/P EST SF 10 MIN: CPT | Performed by: PSYCHIATRY & NEUROLOGY

## 2024-09-17 PROCEDURE — 1126F AMNT PAIN NOTED NONE PRSNT: CPT | Performed by: PSYCHIATRY & NEUROLOGY

## 2024-09-17 ASSESSMENT — PAIN SCALES - GENERAL: PAINLEVEL: 0-NO PAIN

## 2024-09-17 NOTE — TELEPHONE ENCOUNTER
Deon Babcock ( John's son who came to the office visit with him today) John would like to start the medication for memory loss. He would like the pill form sent to [facility] [address]

## 2024-09-17 NOTE — PATIENT INSTRUCTIONS
Your MRI from September was stable without new or active MS lesions, which is great.     Your blood work from April was also good.     Please continue the same dose of carbidopa/levodopa     Let me know in the future if you want to start exelon treatment to slow down memory decline.     Please continue the same dose of vitamin D.     Repeat brain MRI in September of 2025 but follow up with Mari after six months.     Thank you visiting the clinic today    Joon Treadwell MD

## 2024-09-17 NOTE — PROGRESS NOTES
Diagnoses/Problems  Assessed    · Multiple sclerosis (340) (G35)   · Parkinsonism (332.0) (G20)        Chief Complaint  PD and MS.      History of Present Illness          Provider: Joon Treadwell         Patient name: DEONTE WISE   DMITRIYB: Sep 8 1945   PCP: none      Diagnosis if known: SPMS + parkinsonism   Date of onset: 2015  Date of diagnosis: 08/22/2019  disease course at onset: relapsing  disease course now: progressive without relapses   Current DMT: Ocrevus as of 9/2019  Previous DMTs: none   Last MRI brain: 9/2024  Last MRI cervical: 6/2019  Last MRI thoracic: 6/2019  Last OCT: not done  CSF: cells normal, P 53, positive OCBs  JCV: negative, index 0.2, August 2019  VZV: positive 8/2019  HEP panel: negative 8/2019  NMO: negative July 2019  MOG: negative July 2019        This is a 75 yo right handed AAM with hx of retinitis pigmentosa (legally blind), HTN, and HPL who presents for follow up regarding mild parkinsonism and SPMS     Interval hx:  Stable. No new symptoms. No infections. MRI stable. Blood work in April all good.         PD symptom review:      He is a right-handed 76 year-old man with difficulty walking and a tremor in the left arm.   Tremor at rest and postural instability, but no rigidity of movement, no loss of postural reflexes and no frequent falls.   He experiences no noticeable benefit from the medications.  By report, there is good compliance with treatment, good tolerance of treatment and poor symptom control.   Previous medications included: levodopa-carbidopa (Sinemet).    He reports balance problems, but no falls.   He reports no speech problems and no dysphagia.   He reports no constipation . Has chronic increased urgency and frequency.   He reports he has no symptoms of depression and no anxiety.   He reports no difficulty falling asleep, no difficulty staying asleep, no acting out of dreams and no restless legs . Goes to bed at 10am, up at 12/1am due to having to urinate, then  up again at 4am. has vivid dreaming.   He reports no concern about memory, no hallucinations and no delusions . Does not drive due to vision impairment (had to retire in 2007 due to vision). He does not drive   He reports no recent physical therapy and no recent occupational therapy. He exercises.       Review of Systems  All systems reviewed and are negative except as mentioned in the HPI.        Active Problems  Problems    · Abdominal distension (787.3) (R14.0)   · Blindness, bilateral (369.00) (H54.3)   · Couldn't count fingers in either eye.   · Cerebellar atrophy (331.9) (G31.9)   · Decreased sense of smell (781.1) (R43.8)   · Foreign body sensation in throat (784.99) (R09.89)   · HLD (hyperlipidemia) (272.4) (E78.5)   · Hypertension (401.9) (I10)   · Left leg pain (729.5) (M79.605)   · Left shoulder pain (719.41) (M25.512)   · Multiple sclerosis (340) (G35)   · Need for hepatitis B screening test (V73.89) (Z11.59)   · Pain of paraspinal muscle (729.1) (M79.18)   · Parkinsonism (332.0) (G20)   · Postnasal drip (784.91) (R09.82)   · Postural instability (729.90) (R29.3)   · Primary open angle glaucoma (365.11,365.70) (H40.1190)   · Added by Problem List Migration; 2013-12-20   · Retinitis pigmentosa (362.74) (H35.52)   · Diagnosed in 2007.   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   · History of glaucoma (V12.49) (Z86.69)   · History of Need for hepatitis C screening test (V73.89) (Z11.59)   · Resolved Date: 04 Mar 2021   · History of Prostate disease (602.9) (N42.9)   · ?Cancer, s/p ?some cryotherapy 2014 ([facility])   ·  History of Senile nuclear cataract (366.16) (H25.10)   · Added by Problem List Migration; 2013-12-20     Surgical History  Problems    · History of Hernia repair     Family History  Mother    · No pertinent family history  Father    · No pertinent family history  Child    · No pertinent family history  Sibling    · No pertinent family history     Social History  Problems    · Activities of daily living (ADL's), independent   · Lives in a house by himself; able to do thigns at home by himself; Doesn't drive since      2007 due to vision impairment from RP; Brother takes him to the Grocer[employer] store; he can      cook by himself though at home.   · No alcohol use   · Non-smoker (V49.89) (Z78.9)   · Retired   · Was a Dyemaker; last worked in 2007     Allergies  Medication    · No Known Drug Allergies   Recorded By: Rosa Zimmerman; 12/2/2015 11:51:29 AM          Physical Exam  oriented in time, place and person   bilateral severe visual impairment  rest tremor on the left hand , worsened by distraction   bradykinesia R> L   muscle tone: rigidity on activation maneuver   He is barely able to stand without support   strength 4+ grossly on the right , 4- on the left      Scores and Scales     Pyramidal Functions: (2) Minimal disability.   Cerebellar Functions: (3) Moderate truncal or limb ataxia (tremor or clumsy movements interfere with function in all spheres).   Brainstem Functions: (0) Normal.      Sensory Function: (1) Vibration or figure-writing decrease only in one or two limb.                     Bowel and Bladder Function: (1) Mild urinary hesitance, urgency or retention.      Visual Function: (6) Loss of bowel and bladder function.         Cerebral (or Mental) Functions: (0) Normal. 7.5 - Unable to take more than a few steps; restricted to wheelchair; may need aid in transfer; wheels self but cannot carry on in standard wheelchair a full day; May require motorized wheelchair        Provider Impressions  This is a 73  yo right handed AAM with hx of retinitis pigmentosa (legally blind), HTN, and HPL who presents with difficulty walking that started somewhat acutely in 2015 with LLE weakness followed by progressive? gait decline and resting tremor of the LUE. Exam shows evidence of retinitis pigmentosa on fundus photos, mild hypomimia, mild resting tremor and bradykinesia L>R, 4-/5 LLE weakness, and truncal ataxia. He was admitted to [facility] in 2015 after his initial fall and brain MRI showed no acute findings but was positive for impressive cerebellar atrophy per report. 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 He did not respond to a sufficient trial of sinemet. The combination of retinitis pigmentosa, parkinsonism, cerebellar ataxia/atrophy is suspicious for a heredofamilial condition although the abrupt onset and the patient's advanced age are atypical. Although focal limb weakness can occur in some heredofamilial conditions secondary to amyotrophy, the acute onset of the weakness is definitely atypical and requires more in-depth workup. We will start by MRI of the brain and spinal cord WWO. The DD includes one or more of the following conditions: Atypical SCA or other heredofamilial conditions, Atypical parkinsonism, and cerebrovascular or myelopathic process.     07/01/2019: MRI of the spine showed a non-enhancing T2 hyperintense lesion from T4 to T6 concerning for demyelination. Brain MRI shows mainly non-specific lesions in the deep white matter but it does show a few true periventricular and callosal lesions in perpendicular orientation. Although the same thoracic lesion was read previously as a syrinx at [facility], the fact that he had acute LLE weakness as well as the shape of the cord lesion and the findings of brain MRI are all suspicious for a demyelinating process. Retinitis pigmentosa has also been linked to optic neuritis in a few cases. Overall, the patient needs full  myelopathy and demyelination workup at this point.      07/22/2019: All MS mimics came back negative including negative AQP4 and MOG. Will need an LP for MS rule out.      08/22/2019: CSF came back positive for two or more OCBs. No serum sample submitted so it is unknown if the OCBs are restricted to the CSF, however, since there is low suspicion for a systemic inflammatory disease and since the main differentiation was between spinal demyelination versus syrinx, the positive OCBs here can be interpreted in support of MS. Patient feels that his left leg is getting weaker with time. He is likely in the secondary progressive phase of the disease. Given recent relapse in 2015 and ongoing worsening of leg strength, I prefer to start him on ocrevus for active SPMS. He wants to think about it first.      10/17/2019: Started ocrevus in August with good tolerability. He feels that his leg has gotten heavier after ocrevus but no new symptoms. LLE progressive weakness likely represents disease progression rather than ocrevus side effects. I offered him empiric steroids but he's not interested in this.      01/21/2020: Still feels that his legs are getting heavier and is wondering if ocrevus is causing this. He is questioning whether he should continue to be on ocrevus but I encouraged him to continue taking it to slow down disease progression. He has body pains and skin sensitivity that keeps him from sleeping so I will try him on some gabapentin. Constipation is becoming a bothersome problem so I'll try him on miralax.      03/17/2020: skin sensitivity and constipation improved without having to take gabapentin or miralax. Has not done MRI yet.      04/27/2020 still hasn't done MRI. LLE still getting weaker. He thinks that Ocrevus is causing unpleasant body smell. wants to try sinemet again for her LUE tremor. will restart sinemet. I encouraged him to continue ocrevus despite the continued progression as ocrevus may still  have benefit.      08/26/2020: No benefit from sinemet on tremor or leg stiffness which is getting worse. I will add baclofen and send ocrevus labs.      11/03/2020: tremor and anosmia worsening. still does not want to retry sinemet. ocrevus labs normal. overdue for MRI.      01/18/2021: New brain MRI without new or active lesions. right leg feels heavy. back and legs feel itchy and scratchy. will try him on a small dose of gabapentin (previously had nausea with it).      06/01/2021: gabapentin did not help his itching. legs feel shaky and his balance is getting worse but still unwilling to try sinemet again. No weakness or spasticity of the BLE on exam so unlikely related to MS progression. Labs from Feb unremarkable with lymphopenia or hypogammaglobulinemia. no infections. completed both doses of the Pfizer COVID19 vaccine in March.         9/30/21: Still complains of tremors. He is willing to restart the sinemet. labs show 2% CD19 count in February and persistent lymphopenia at 0.8. Normal Ig levels. He did not mount a humoral response to the COVID19 vaccine and I recommended he takes a booster dose.      11/2/2023 Reports worsening of slow gait with the walker. Thinks that sinemet is helping some and is open to a higher dose. Reports some tightness in the stomach. MRI from June stable and labs are good. Will increase sinemet and continue  ocrevus.     9/17/2024: Stable. No new symptoms. No infections. MRI stable. Blood work in April all good. No issues with sinemet. Not taking baclofen any more. Will keep things the same.      PLAN:  - DMT: continue ocrevus.   - ocrevus labs to be done with each infusion.   - Acute relapse management: not indicated but we can try empiric steroids for him in the future if he agrees.   - Symptomatic: continue same dose of sineme.t   - Vitamin D: Continue vitamin D3 42485 UNITS weekly  - Will order brain MRI WWO to be done in September of 2025  - PT/OT: continue PT.   - Follow up  after six months with Mari.      The impression and plan were discussed with the patient and their family (if present) in detail and all questions answered. They agreed to the plan as outlined above.    Joon Treadwell MD

## 2024-09-19 ENCOUNTER — HOME CARE VISIT (OUTPATIENT)
Dept: HOME HEALTH SERVICES | Facility: HOME HEALTH | Age: 79
End: 2024-09-19
Payer: MEDICARE

## 2024-09-19 VITALS
HEART RATE: 65 BPM | TEMPERATURE: 98.3 F | RESPIRATION RATE: 18 BRPM | DIASTOLIC BLOOD PRESSURE: 65 MMHG | SYSTOLIC BLOOD PRESSURE: 136 MMHG

## 2024-09-19 VITALS — DIASTOLIC BLOOD PRESSURE: 74 MMHG | RESPIRATION RATE: 18 BRPM | HEART RATE: 74 BPM | SYSTOLIC BLOOD PRESSURE: 120 MMHG

## 2024-09-19 PROCEDURE — G0152 HHCP-SERV OF OT,EA 15 MIN: HCPCS | Mod: HHH

## 2024-09-19 PROCEDURE — G0157 HHC PT ASSISTANT EA 15: HCPCS | Mod: CQ,HHH

## 2024-09-20 ENCOUNTER — HOME CARE VISIT (OUTPATIENT)
Dept: HOME HEALTH SERVICES | Facility: HOME HEALTH | Age: 79
End: 2024-09-20
Payer: MEDICARE

## 2024-09-20 DIAGNOSIS — G20.A1 PARKINSON'S DISEASE WITHOUT DYSKINESIA OR FLUCTUATING MANIFESTATIONS: Primary | ICD-10-CM

## 2024-09-20 PROCEDURE — G0152 HHCP-SERV OF OT,EA 15 MIN: HCPCS | Mod: HHH

## 2024-09-20 RX ORDER — RIVASTIGMINE TARTRATE 1.5 MG/1
1.5 CAPSULE ORAL 2 TIMES DAILY
Qty: 60 CAPSULE | Refills: 11 | Status: SHIPPED | OUTPATIENT
Start: 2024-09-20 | End: 2025-09-20

## 2024-09-24 ENCOUNTER — HOME CARE VISIT (OUTPATIENT)
Dept: HOME HEALTH SERVICES | Facility: HOME HEALTH | Age: 79
End: 2024-09-24
Payer: MEDICARE

## 2024-09-24 VITALS — RESPIRATION RATE: 18 BRPM | SYSTOLIC BLOOD PRESSURE: 130 MMHG | DIASTOLIC BLOOD PRESSURE: 70 MMHG | HEART RATE: 70 BPM

## 2024-09-24 PROCEDURE — G0157 HHC PT ASSISTANT EA 15: HCPCS | Mod: CQ,HHH

## 2024-09-25 ENCOUNTER — HOME CARE VISIT (OUTPATIENT)
Dept: HOME HEALTH SERVICES | Facility: HOME HEALTH | Age: 79
End: 2024-09-25
Payer: MEDICARE

## 2024-09-25 PROCEDURE — G0321 HH/HSPC RD PHONE VISIT: HCPCS | Mod: HHH

## 2024-09-25 PROCEDURE — G0152 HHCP-SERV OF OT,EA 15 MIN: HCPCS | Mod: HHH

## 2024-09-26 ENCOUNTER — HOME CARE VISIT (OUTPATIENT)
Dept: HOME HEALTH SERVICES | Facility: HOME HEALTH | Age: 79
End: 2024-09-26
Payer: MEDICARE

## 2024-09-26 PROCEDURE — G0157 HHC PT ASSISTANT EA 15: HCPCS | Mod: CQ,HHH

## 2024-10-01 ENCOUNTER — HOME CARE VISIT (OUTPATIENT)
Dept: HOME HEALTH SERVICES | Facility: HOME HEALTH | Age: 79
End: 2024-10-01
Payer: MEDICARE

## 2024-10-01 VITALS — SYSTOLIC BLOOD PRESSURE: 126 MMHG | HEART RATE: 74 BPM | RESPIRATION RATE: 17 BRPM | DIASTOLIC BLOOD PRESSURE: 74 MMHG

## 2024-10-01 PROCEDURE — G0157 HHC PT ASSISTANT EA 15: HCPCS | Mod: CQ,HHH

## 2024-10-02 ENCOUNTER — HOME CARE VISIT (OUTPATIENT)
Dept: HOME HEALTH SERVICES | Facility: HOME HEALTH | Age: 79
End: 2024-10-02
Payer: MEDICARE

## 2024-10-02 VITALS — RESPIRATION RATE: 16 BRPM | DIASTOLIC BLOOD PRESSURE: 68 MMHG | SYSTOLIC BLOOD PRESSURE: 137 MMHG

## 2024-10-02 PROCEDURE — G0152 HHCP-SERV OF OT,EA 15 MIN: HCPCS | Mod: HHH

## 2024-10-02 ASSESSMENT — PAIN SCALES - PAIN ASSESSMENT IN ADVANCED DEMENTIA (PAINAD)
FACIALEXPRESSION: 2 - FACIAL GRIMACING.
BODYLANGUAGE: 0 - RELAXED.
TOTALSCORE: 3
NEGVOCALIZATION: 1
NEGVOCALIZATION: 1 - OCCASIONAL MOAN OR GROAN. LOW-LEVEL SPEECH WITH A NEGATIVE OR DISAPPROVING QUALITY.
FACIALEXPRESSION: 2
CONSOLABILITY: 0 - NO NEED TO CONSOLE.
CONSOLABILITY: 0
BREATHING: 0
BODYLANGUAGE: 0

## 2024-10-02 ASSESSMENT — ACTIVITIES OF DAILY LIVING (ADL)
AMBULATION ASSISTANCE: SUPERVISION
AMBULATION ASSISTANCE: 1

## 2024-10-02 NOTE — CASE COMMUNICATION
Ot discipline dishcarge completed. Patient living with blindness, PD and MS able to compkete daily wash up in bathroom at edge of tub, dresses self including locating clothing. All skilled instruciton completed for home safety strategies to increase time on feet and grab bar locations to reduce knee pain crawling on hands and knees on hard surfaces. Grab bar locations identified and caregiver strategies instructed to promote fall risk r eductio and pain relief during mobility. Patient caregiver verbalize readiness for discharge from home occupational therapy. Goals met.

## 2024-10-03 ENCOUNTER — HOME CARE VISIT (OUTPATIENT)
Dept: HOME HEALTH SERVICES | Facility: HOME HEALTH | Age: 79
End: 2024-10-03
Payer: MEDICARE

## 2024-10-03 VITALS — SYSTOLIC BLOOD PRESSURE: 121 MMHG | HEART RATE: 60 BPM | DIASTOLIC BLOOD PRESSURE: 66 MMHG

## 2024-10-03 PROCEDURE — G0151 HHCP-SERV OF PT,EA 15 MIN: HCPCS | Mod: HHH

## 2024-10-03 ASSESSMENT — ACTIVITIES OF DAILY LIVING (ADL)
HOME_HEALTH_OASIS: 01
OASIS_M1830: 02
CURRENT_FUNCTION: STAND BY ASSIST
AMBULATION ASSISTANCE: STAND BY ASSIST

## 2024-10-03 ASSESSMENT — ENCOUNTER SYMPTOMS
HIGHEST PAIN SEVERITY IN PAST 24 HOURS: 3/10
PAIN: 1
PERSON REPORTING PAIN: PATIENT
LOWEST PAIN SEVERITY IN PAST 24 HOURS: 0/10

## 2024-10-14 ENCOUNTER — TELEPHONE (OUTPATIENT)
Dept: NEUROLOGY | Facility: CLINIC | Age: 79
End: 2024-10-14
Payer: MEDICARE

## 2024-10-14 DIAGNOSIS — G35 MULTIPLE SCLEROSIS (MULTI): Primary | ICD-10-CM

## 2024-10-14 DIAGNOSIS — R20.9 SENSORY DISTURBANCE: ICD-10-CM

## 2024-10-14 NOTE — TELEPHONE ENCOUNTER
John Peterson called. He would like a new script for Vit-D sent to his local pharmacy on file. Please send several refills.

## 2024-10-25 ENCOUNTER — APPOINTMENT (OUTPATIENT)
Dept: INFUSION THERAPY | Facility: CLINIC | Age: 79
End: 2024-10-25
Payer: MEDICARE

## 2024-10-25 VITALS
HEART RATE: 97 BPM | BODY MASS INDEX: 21.38 KG/M2 | SYSTOLIC BLOOD PRESSURE: 158 MMHG | DIASTOLIC BLOOD PRESSURE: 76 MMHG | WEIGHT: 166.56 LBS | TEMPERATURE: 97.1 F | RESPIRATION RATE: 18 BRPM | OXYGEN SATURATION: 99 %

## 2024-10-25 DIAGNOSIS — D84.821 DRUG-INDUCED IMMUNODEFICIENCY (MULTI): ICD-10-CM

## 2024-10-25 DIAGNOSIS — Z79.899 DRUG-INDUCED IMMUNODEFICIENCY (MULTI): ICD-10-CM

## 2024-10-25 DIAGNOSIS — G35 MULTIPLE SCLEROSIS (MULTI): ICD-10-CM

## 2024-10-25 LAB
BASOPHILS # BLD AUTO: 0 X10*3/UL (ref 0–0.1)
BASOPHILS NFR BLD AUTO: 0 %
EOSINOPHIL # BLD AUTO: 0 X10*3/UL (ref 0–0.4)
EOSINOPHIL NFR BLD AUTO: 0 %
ERYTHROCYTE [DISTWIDTH] IN BLOOD BY AUTOMATED COUNT: 14.6 % (ref 11.5–14.5)
HCT VFR BLD AUTO: 41 % (ref 41–52)
HGB BLD-MCNC: 13.4 G/DL (ref 13.5–17.5)
IGA SERPL-MCNC: 226 MG/DL (ref 70–400)
IGG SERPL-MCNC: 1100 MG/DL (ref 700–1600)
IGM SERPL-MCNC: 40 MG/DL (ref 40–230)
IMM GRANULOCYTES # BLD AUTO: 0 X10*3/UL (ref 0–0.5)
IMM GRANULOCYTES NFR BLD AUTO: 0 % (ref 0–0.9)
LYMPHOCYTES # BLD AUTO: 1.73 X10*3/UL (ref 0.8–3)
LYMPHOCYTES NFR BLD AUTO: 34.4 %
MCH RBC QN AUTO: 27.6 PG (ref 26–34)
MCHC RBC AUTO-ENTMCNC: 32.7 G/DL (ref 32–36)
MCV RBC AUTO: 85 FL (ref 80–100)
MONOCYTES # BLD AUTO: 0.62 X10*3/UL (ref 0.05–0.8)
MONOCYTES NFR BLD AUTO: 12.3 %
NEUTROPHILS # BLD AUTO: 2.68 X10*3/UL (ref 1.6–5.5)
NEUTROPHILS NFR BLD AUTO: 53.3 %
NRBC BLD-RTO: 0 /100 WBCS (ref 0–0)
PLATELET # BLD AUTO: 308 X10*3/UL (ref 150–450)
RBC # BLD AUTO: 4.85 X10*6/UL (ref 4.5–5.9)
WBC # BLD AUTO: 5 X10*3/UL (ref 4.4–11.3)

## 2024-10-25 PROCEDURE — 82784 ASSAY IGA/IGD/IGG/IGM EACH: CPT

## 2024-10-25 PROCEDURE — 88184 FLOWCYTOMETRY/ TC 1 MARKER: CPT

## 2024-10-25 PROCEDURE — 88185 FLOWCYTOMETRY/TC ADD-ON: CPT

## 2024-10-25 PROCEDURE — 85025 COMPLETE CBC W/AUTO DIFF WBC: CPT

## 2024-10-25 PROCEDURE — 88187 FLOWCYTOMETRY/READ 2-8: CPT | Performed by: PSYCHIATRY & NEUROLOGY

## 2024-10-25 RX ORDER — DIPHENHYDRAMINE HYDROCHLORIDE 50 MG/ML
25 INJECTION INTRAMUSCULAR; INTRAVENOUS ONCE
Status: COMPLETED | OUTPATIENT
Start: 2024-10-25 | End: 2024-10-25

## 2024-10-25 RX ORDER — EPINEPHRINE 0.3 MG/.3ML
0.3 INJECTION SUBCUTANEOUS EVERY 5 MIN PRN
OUTPATIENT
Start: 2025-04-26

## 2024-10-25 RX ORDER — DIPHENHYDRAMINE HYDROCHLORIDE 50 MG/ML
50 INJECTION INTRAMUSCULAR; INTRAVENOUS AS NEEDED
OUTPATIENT
Start: 2025-04-26

## 2024-10-25 RX ORDER — ACETAMINOPHEN 325 MG/1
650 TABLET ORAL ONCE
OUTPATIENT
Start: 2025-04-26

## 2024-10-25 RX ORDER — FAMOTIDINE 10 MG/ML
20 INJECTION INTRAVENOUS ONCE AS NEEDED
OUTPATIENT
Start: 2025-04-26

## 2024-10-25 RX ORDER — ACETAMINOPHEN 325 MG/1
650 TABLET ORAL ONCE
Status: COMPLETED | OUTPATIENT
Start: 2024-10-25 | End: 2024-10-25

## 2024-10-25 RX ORDER — DIPHENHYDRAMINE HYDROCHLORIDE 50 MG/ML
25 INJECTION INTRAMUSCULAR; INTRAVENOUS ONCE
OUTPATIENT
Start: 2025-04-26

## 2024-10-25 RX ORDER — ALBUTEROL SULFATE 0.83 MG/ML
3 SOLUTION RESPIRATORY (INHALATION) AS NEEDED
OUTPATIENT
Start: 2025-04-26

## 2024-10-25 ASSESSMENT — ENCOUNTER SYMPTOMS
UNEXPECTED WEIGHT CHANGE: 0
EYE PROBLEMS: 0
CHILLS: 0
DEPRESSION: 0
SORE THROAT: 0
ABDOMINAL PAIN: 0
FEVER: 0
SHORTNESS OF BREATH: 0
MYALGIAS: 0
CONSTIPATION: 1
LIGHT-HEADEDNESS: 0
BLOOD IN STOOL: 0
VOICE CHANGE: 0
COUGH: 0
HEMATURIA: 0
HEADACHES: 0
TROUBLE SWALLOWING: 0
LEG SWELLING: 0
ARTHRALGIAS: 1
VOMITING: 0
FREQUENCY: 0
DYSURIA: 0
FATIGUE: 0
NUMBNESS: 0
EXTREMITY WEAKNESS: 1
WHEEZING: 0
PALPITATIONS: 0
DIARRHEA: 0
NAUSEA: 0
ROS GI COMMENTS: MANAGED
WOUND: 0
DIZZINESS: 0
APPETITE CHANGE: 0
NERVOUS/ANXIOUS: 0

## 2024-10-25 NOTE — PROGRESS NOTES
[facility]   Infusion Clinic Note   Date: 2024   Name: John Peterson  : 1945   MRN: [id number]          Reason for Visit: OP Infusion and Follow-up (PT HERE FOR OCREVUS 600 MG INFUSION/NEXT APPT: 6 MONTHS)         Today: We administered acetaminophen, methylPREDNISolone sod succinate, diphenhydrAMINE, and ocrelizumab (Ocrevus) 600 mg in sodium chloride 0.9% 520 mL IV.       Visit Type: INFUSION       Ordered By: DR LEWIS       Accompanied by:Child/Children       Diagnosis: Multiple sclerosis (Multi)    Drug-induced immunodeficiency (Multi)        Allergies:   Allergies as of 10/25/2024    (No Known Allergies)          Current Medications has a current medication list which includes the following prescription(s): amlodipine, benzalkonium chloride, carbidopa-levodopa, cholecalciferol, hydrochlorothiazide, ibuprofen, ocrelizumab, omeprazole, oxybutynin, pravastatin, and rivastigmine.       Vitals:   Vitals:    10/25/24 1000 10/25/24 1110 10/25/24 1125 10/25/24 1300   BP: 139/64 130/62 125/72 126/71   Pulse: 65 66 67 67   Resp: 16 16 16 16   Temp: 36.4 °C (97.6 °F) 36.7 °C (98 °F) 36.7 °C (98 °F) 36.3 °C (97.3 °F)   SpO2: 99% 97% 98% 98%   Weight: 75.5 kg (166 lb 8.9 oz)       10/25/24 1400   BP: 158/76  Comment: JUST GOT UP TO USE THE RESTROOM   Pulse: 97   Resp: 18   Temp: 36.2 °C (97.1 °F)   SpO2: 99%   Weight:              Infusion Pre-procedure Checklist:   - Allergies reviewed: yes   - Medications reviewed: yes       - Previous reaction to current treatment: no      Assess patient for the concerns below. Document provider notification as appropriate.  - Active or recent infection with/without current antibiotic use: no  - Recent or planned invasive dental work: no  - Recent or planned surgeries: no  - Recently received or plans to receive vaccinations: no  - Has treatment related toxicities: no  - Is pregnant:  n/a      Pain: 0   - Is the pain different from normal: n/a    - Is your Doctor aware:  n/a       Labs: Labs drawn and sent per order          Fall Risk Screening: Limon Fall Risk  History of Falling, Immediate or Within 3 Months: No  Secondary Diagnosis: No  Ambulatory Aid: Furniture  Intravenous Therapy/Heparin Lock: Yes  Gait/Transferring: Weak  Mental Status: Oriented to own ability  Limon Fall Risk Score: 60       Review Of Systems:  Review of Systems   Constitutional:  Negative for appetite change, chills, fatigue, fever and unexpected weight change.   HENT:   Negative for hearing loss, mouth sores, sore throat, tinnitus, trouble swallowing and voice change.    Eyes:  Negative for eye problems.   Respiratory:  Negative for cough, shortness of breath and wheezing.    Cardiovascular:  Negative for chest pain, leg swelling and palpitations.   Gastrointestinal:  Positive for constipation. Negative for abdominal pain, blood in stool, diarrhea, nausea and vomiting.        MANAGED   Genitourinary:  Negative for dysuria, frequency and hematuria.    Musculoskeletal:  Positive for arthralgias. Negative for myalgias.        SHOULDER PAIN   Skin:  Negative for itching, rash and wound.   Neurological:  Positive for extremity weakness. Negative for dizziness, headaches, light-headedness and numbness.   Psychiatric/Behavioral:  Negative for depression. The patient is not nervous/anxious.          ROS completed? Yes      Infusion Readiness:  - Assessment Concerns Related to Infusion: No  - Provider notified: n/a      Document Below Only If Indicated:   New Patient Education:    N/A (returning patient for continuation of therapy. Ongoing education provided as needed.)        Treatment Conditions & Drug Specific Questions:    Ocrelizumab  (OCREVUS)    (Unless otherwise specified on patient specific therapy plan):     TREATMENT CONDITIONS:  Unless otherwise specified on patient specific therapy plan HOLD and notify provider prior to proceeding with today's infusion if patient with:  o  "Positive Hepatitis B Surface Ag or panel  o Positive T-Spot    Lab Results   Component Value Date    TBSIN Negative 08/22/2019      Lab Results   Component Value Date    HEPBSAG NONREACTIVE 08/22/2019      Lab Results   Component Value Date    HEPBCAB NONREACTIVE 08/22/2019    HEPCAB NONREACTIVE 03/03/2021      No results found for: \"NONUHFIRE\", \"NONUHSWGH\", \"NONUHFISH\", \"EXTHEPBSAG\"    Labs reviewed and patient meets treatment conditions? Yes    DRUG SPECIFIC QUESTIONS:  Any signs or symptoms of Progressive multifocal leukoencephalopathy (PML) which may include: memory loss, trouble thinking, dizziness, loss of balance, difficulty talking / walking or loss of vision? No    (If YES HOLD and notify provider)      REMINDER:  Recommended Vitals/Observation:  Vitals: Obtain vital signs every 30 minutes / with each ramp up. Once maximum rate is reached obtain vitals hourly until infusion is complete. Obtain vitals at end of observation period and PRN.  Observation: Observe patient for 60 minutes after each infusion.        Weight Based Drug Calculations:    WEIGHT BASED DRUGS: NOT APPLICABLE / FLAT DOSE          Initiated By: Katelynn Daniels RN        "

## 2024-10-25 NOTE — PATIENT INSTRUCTIONS
Today :We administered acetaminophen, methylPREDNISolone sod succinate, diphenhydrAMINE, and ocrelizumab (Ocrevus) 600 mg in sodium chloride 0.9% 520 mL IV.     For:   1. Multiple sclerosis (Multi)    2. Drug-induced immunodeficiency (Multi)         Your next appointment is due in:  180 DAYS        Please read the  Medication Guide that was given to you and reviewed during todays visit.     (Tell all doctors including dentists that you are taking this medication)     Go to the emergency room or call 911 if:  -You have signs of allergic reaction:   -Rash, hives, itching.   -Swollen, blistered, peeling skin.   -Swelling of face, lips, mouth, tongue or throat.   -Tightness of chest, trouble breathing, swallowing or talking     Call your doctor:  - If IV / injection site gets red, warm, swollen, itchy or leaks fluid or pus.     (Leave dressing on your IV site for at least 2 hours and keep area clean and dry  - If you get sick or have symptoms of infection or are not feeling well for any reason.    (Wash your hands often, stay away from people who are sick)  - If you have side effects from your medication that do not go away or are bothersome.     (Refer to the teaching your nurse gave you for side effects to call your doctor about)    - Common side effects may include:  stuffy nose, headache, feeling tired, muscle aches, upset stomach  - Before receiving any vaccines     - Call the [facility] at   If:  - You get sick, are on antibiotics, have had a recent vaccine, have surgery or dental work and your doctor wants your visit rescheduled.  - You need to cancel and reschedule your visit for any reason. Call at least 2 days before your visit if you need to cancel.   - Your insurance changes before your next visit.    (We will need to get approval from your new insurance. This can take up to two weeks.)     The [facility] is opened Monday thru Friday. We are closed on weekends and  holidays.   Voice mail will take your call if the center is closed. If you leave a message please allow 24 hours for a call back during weekdays. If you leave a message on a weekend/holiday, we will call you back the next business day.

## 2024-10-28 LAB
CD19 CELLS # BLD: 0 X10E9/L
CD19 CELLS NFR BLD: 0.2 %
FLOW CYTOMETRY SPECIALIST REVIEW: ABNORMAL
LYMPHOCYTES # SPEC AUTO: 1.73 X10*3/UL
PATH REVIEW, B CELL PHENOTYPING, EXTENDED: ABNORMAL

## 2025-01-07 DIAGNOSIS — G35 MULTIPLE SCLEROSIS (MULTI): ICD-10-CM

## 2025-01-08 RX ORDER — CARBIDOPA AND LEVODOPA 25; 100 MG/1; MG/1
TABLET ORAL
Qty: 270 TABLET | Refills: 2 | Status: SHIPPED | OUTPATIENT
Start: 2025-01-08

## 2025-03-14 DIAGNOSIS — G35 MULTIPLE SCLEROSIS (MULTI): Primary | ICD-10-CM

## 2025-03-14 DIAGNOSIS — Z79.899 DRUG-INDUCED IMMUNODEFICIENCY (MULTI): ICD-10-CM

## 2025-03-14 DIAGNOSIS — D84.821 DRUG-INDUCED IMMUNODEFICIENCY (MULTI): ICD-10-CM

## 2025-03-14 RX ORDER — DIPHENHYDRAMINE HYDROCHLORIDE 50 MG/ML
25 INJECTION INTRAMUSCULAR; INTRAVENOUS ONCE
OUTPATIENT
Start: 2025-04-26

## 2025-03-14 RX ORDER — EPINEPHRINE 0.3 MG/.3ML
0.3 INJECTION SUBCUTANEOUS EVERY 5 MIN PRN
OUTPATIENT
Start: 2025-04-26

## 2025-03-14 RX ORDER — ACETAMINOPHEN 325 MG/1
650 TABLET ORAL ONCE
OUTPATIENT
Start: 2025-04-26

## 2025-03-14 RX ORDER — ALBUTEROL SULFATE 0.83 MG/ML
3 SOLUTION RESPIRATORY (INHALATION) AS NEEDED
OUTPATIENT
Start: 2025-04-26

## 2025-03-14 RX ORDER — FAMOTIDINE 10 MG/ML
20 INJECTION, SOLUTION INTRAVENOUS ONCE AS NEEDED
OUTPATIENT
Start: 2025-04-26

## 2025-03-14 RX ORDER — DIPHENHYDRAMINE HYDROCHLORIDE 50 MG/ML
50 INJECTION INTRAMUSCULAR; INTRAVENOUS AS NEEDED
OUTPATIENT
Start: 2025-04-26

## 2025-04-25 ENCOUNTER — APPOINTMENT (OUTPATIENT)
Dept: INFUSION THERAPY | Facility: CLINIC | Age: 80
End: 2025-04-25
Payer: MEDICARE

## 2025-04-29 DIAGNOSIS — G35 MULTIPLE SCLEROSIS (MULTI): ICD-10-CM

## 2025-05-02 RX ORDER — CARBIDOPA AND LEVODOPA 25; 100 MG/1; MG/1
TABLET ORAL
Qty: 405 TABLET | Refills: 3 | Status: SHIPPED | OUTPATIENT
Start: 2025-05-02

## 2025-05-12 ENCOUNTER — APPOINTMENT (OUTPATIENT)
Dept: INFUSION THERAPY | Facility: CLINIC | Age: 80
End: 2025-05-12
Payer: MEDICARE

## 2025-05-12 VITALS
DIASTOLIC BLOOD PRESSURE: 72 MMHG | RESPIRATION RATE: 16 BRPM | BODY MASS INDEX: 23.92 KG/M2 | SYSTOLIC BLOOD PRESSURE: 146 MMHG | WEIGHT: 186.29 LBS | HEART RATE: 76 BPM | TEMPERATURE: 97.8 F | OXYGEN SATURATION: 98 %

## 2025-05-12 DIAGNOSIS — Z79.899 DRUG-INDUCED IMMUNODEFICIENCY (MULTI): ICD-10-CM

## 2025-05-12 DIAGNOSIS — G35 MULTIPLE SCLEROSIS (MULTI): ICD-10-CM

## 2025-05-12 DIAGNOSIS — D84.821 DRUG-INDUCED IMMUNODEFICIENCY (MULTI): ICD-10-CM

## 2025-05-12 LAB
BASOPHILS # BLD AUTO: 0.01 X10*3/UL (ref 0–0.1)
BASOPHILS NFR BLD AUTO: 0.2 %
EOSINOPHIL # BLD AUTO: 0 X10*3/UL (ref 0–0.4)
EOSINOPHIL NFR BLD AUTO: 0 %
ERYTHROCYTE [DISTWIDTH] IN BLOOD BY AUTOMATED COUNT: 13.9 % (ref 11.5–14.5)
HCT VFR BLD AUTO: 43.4 % (ref 41–52)
HGB BLD-MCNC: 14.2 G/DL (ref 13.5–17.5)
IGA SERPL-MCNC: 215 MG/DL (ref 70–400)
IGG SERPL-MCNC: 1160 MG/DL (ref 700–1600)
IGM SERPL-MCNC: 38 MG/DL (ref 40–230)
IMM GRANULOCYTES # BLD AUTO: 0 X10*3/UL (ref 0–0.5)
IMM GRANULOCYTES NFR BLD AUTO: 0 % (ref 0–0.9)
LYMPHOCYTES # BLD AUTO: 2.25 X10*3/UL (ref 0.8–3)
LYMPHOCYTES NFR BLD AUTO: 38.7 %
MCH RBC QN AUTO: 27.5 PG (ref 26–34)
MCHC RBC AUTO-ENTMCNC: 32.7 G/DL (ref 32–36)
MCV RBC AUTO: 84 FL (ref 80–100)
MONOCYTES # BLD AUTO: 0.65 X10*3/UL (ref 0.05–0.8)
MONOCYTES NFR BLD AUTO: 11.2 %
NEUTROPHILS # BLD AUTO: 2.9 X10*3/UL (ref 1.6–5.5)
NEUTROPHILS NFR BLD AUTO: 49.9 %
NRBC BLD-RTO: 0 /100 WBCS (ref 0–0)
PLATELET # BLD AUTO: 272 X10*3/UL (ref 150–450)
RBC # BLD AUTO: 5.17 X10*6/UL (ref 4.5–5.9)
WBC # BLD AUTO: 5.8 X10*3/UL (ref 4.4–11.3)

## 2025-05-12 PROCEDURE — 85025 COMPLETE CBC W/AUTO DIFF WBC: CPT

## 2025-05-12 PROCEDURE — 96415 CHEMO IV INFUSION ADDL HR: CPT | Performed by: NURSE PRACTITIONER

## 2025-05-12 PROCEDURE — 96375 TX/PRO/DX INJ NEW DRUG ADDON: CPT | Performed by: NURSE PRACTITIONER

## 2025-05-12 PROCEDURE — 82784 ASSAY IGA/IGD/IGG/IGM EACH: CPT

## 2025-05-12 PROCEDURE — 88184 FLOWCYTOMETRY/ TC 1 MARKER: CPT

## 2025-05-12 PROCEDURE — 88185 FLOWCYTOMETRY/TC ADD-ON: CPT

## 2025-05-12 PROCEDURE — 88187 FLOWCYTOMETRY/READ 2-8: CPT | Performed by: PSYCHIATRY & NEUROLOGY

## 2025-05-12 PROCEDURE — 96413 CHEMO IV INFUSION 1 HR: CPT | Performed by: NURSE PRACTITIONER

## 2025-05-12 RX ORDER — EPINEPHRINE 0.3 MG/.3ML
0.3 INJECTION SUBCUTANEOUS EVERY 5 MIN PRN
OUTPATIENT
Start: 2025-10-26

## 2025-05-12 RX ORDER — ACETAMINOPHEN 325 MG/1
650 TABLET ORAL ONCE
Status: COMPLETED | OUTPATIENT
Start: 2025-05-12 | End: 2025-05-12

## 2025-05-12 RX ORDER — ALBUTEROL SULFATE 0.83 MG/ML
3 SOLUTION RESPIRATORY (INHALATION) AS NEEDED
OUTPATIENT
Start: 2025-10-26

## 2025-05-12 RX ORDER — DIPHENHYDRAMINE HYDROCHLORIDE 50 MG/ML
25 INJECTION, SOLUTION INTRAMUSCULAR; INTRAVENOUS ONCE
Status: COMPLETED | OUTPATIENT
Start: 2025-05-12 | End: 2025-05-12

## 2025-05-12 RX ORDER — FAMOTIDINE 10 MG/ML
20 INJECTION, SOLUTION INTRAVENOUS ONCE AS NEEDED
OUTPATIENT
Start: 2025-10-26

## 2025-05-12 RX ORDER — DIPHENHYDRAMINE HYDROCHLORIDE 50 MG/ML
25 INJECTION, SOLUTION INTRAMUSCULAR; INTRAVENOUS ONCE
OUTPATIENT
Start: 2025-10-26

## 2025-05-12 RX ORDER — ACETAMINOPHEN 325 MG/1
650 TABLET ORAL ONCE
OUTPATIENT
Start: 2025-10-26

## 2025-05-12 RX ORDER — DIPHENHYDRAMINE HYDROCHLORIDE 50 MG/ML
50 INJECTION, SOLUTION INTRAMUSCULAR; INTRAVENOUS AS NEEDED
OUTPATIENT
Start: 2025-10-26

## 2025-05-12 RX ADMIN — DIPHENHYDRAMINE HYDROCHLORIDE 25 MG: 50 INJECTION, SOLUTION INTRAMUSCULAR; INTRAVENOUS at 11:01

## 2025-05-12 RX ADMIN — ACETAMINOPHEN 650 MG: 325 TABLET ORAL at 10:50

## 2025-05-12 ASSESSMENT — ENCOUNTER SYMPTOMS
FATIGUE: 0
ARTHRALGIAS: 0
NAUSEA: 0
ADENOPATHY: 0
COUGH: 1
DYSURIA: 0
SORE THROAT: 0
PALPITATIONS: 0
DECREASED CONCENTRATION: 0
CONSTIPATION: 0
MYALGIAS: 0
HEADACHES: 0
TROUBLE SWALLOWING: 0
WOUND: 0
DIARRHEA: 0
NUMBNESS: 0
FEVER: 0
APPETITE CHANGE: 0
VOMITING: 0
NERVOUS/ANXIOUS: 0
DEPRESSION: 0
DIZZINESS: 1
EYE PROBLEMS: 1
SHORTNESS OF BREATH: 0
LEG SWELLING: 0
CONFUSION: 0

## 2025-05-12 NOTE — PROGRESS NOTES
[facility]   Infusion Clinic Note   Date: May 12, 2025   Name: John Peterson  : 1945   MRN: [id number]         Reason for Visit: OP Infusion (PT. HERE FOR Q 6 MONTH OCREVUS 600 MG IV RAPID INFUSION.)         Today: We administered acetaminophen, methylPREDNISolone sod succinate, diphenhydrAMINE, and ocrelizumab (Ocrevus) 600 mg in sodium chloride 0.9% 520 mL IV.       Ordered By: Joon Treadwell MD       For a Diagnosis of: Drug-induced immunodeficiency (Multi)    Multiple sclerosis (Multi)       At today's visit patient accompanied by:  DAUGHTER LEFT AROUND 11:00, STATED CAREGIVER WOULD BE THERE BETWEEN 12:30 AND 1:00 PM. URINAL PROVIDED, INSTRUCTED ON USE OF CALL LIGHT , ATTACHED LIGHT TO SHIRT.      Today's Vitals:   Vitals:    25 0959 25 1210 25 1237   BP: 146/75 155/76 145/77   Pulse: 58 61 60   Resp: 18 18 16   Temp: 36.7 °C (98.1 °F) 36.6 °C (97.9 °F) 36.5 °C (97.7 °F)   SpO2: 99% 98% 96%   Weight: 84.5 kg (186 lb 4.6 oz)               Pre - Treatment Checklist:      - Previous reaction to current treatment: no      (Assess patient for the concerns below. Document provider notification as appropriate).  - Active or recent infection with/without current antibiotic use: no  - Recent or planned invasive dental work: no  - Recent or planned surgeries: no  - Recently received or plans to receive vaccinations: no  - Has treatment related toxicities: no  - Any chance may be pregnant:  n/a      Pain: 1, PT. STATES SHOES ARE TOO SMALL , RIGHT GREAT TOE HURTS WHEN HE STANDS TO TRANSFER.   - Is the pain different from normal: yes   - Is prescribing Doctor aware:  no      Labs: Labs drawn and sent per order      Fall Risk Screening: Braxton Fall Risk  History of Falling, Immediate or Within 3 Months:  (PT. STATES AT HOME HE CRAWLS.)  Secondary Diagnosis: Yes (PT. HAS BOTH MULTIPLE SCLEROSIS AND PARKINSONS.)  Ambulatory Aid: Crutches/cane/walker (PT. ADMITTED IN  "WHEELCHAIR.)  Intravenous Therapy/Heparin Lock: Yes  Gait/Transferring: Impaired   Mental Status: Oriented to own ability       Review Of Systems:  Review of Systems   Constitutional:  Negative for appetite change, fatigue and fever.   HENT:   Positive for hearing loss. Negative for mouth sores, sore throat and trouble swallowing.         PT. STATES HEARING IS ONLY ABOUT 50%.   Eyes:  Positive for eye problems.        PT. IS BLIND.   Respiratory:  Positive for cough. Negative for shortness of breath.         PT. ADMITS TO A COUGH ONCE IN A WHILE.   Cardiovascular:  Negative for chest pain, leg swelling and palpitations.   Gastrointestinal:  Negative for constipation, diarrhea, nausea and vomiting.   Genitourinary:  Negative for dysuria.    Musculoskeletal:  Negative for arthralgias and myalgias.        PT. WITH MULTIPLE SCLEROSIS AND PARKINSONS.  PT. FEELS HIS LEGS \" WEIGH A TON\" ADMITS TO CRAWLING AT HOME.  SLIGHT RIGHT GREAT TOE PAIN, SHOES NOT FIT PROPERLY, DENIES OPEN AREAS.   Skin:  Negative for itching, rash and wound.   Neurological:  Positive for dizziness. Negative for headaches and numbness.        PT. WITH MULTIPLE SCLEROSIS, HERE FOR Q 6 MONTH OCREVUS.  ADMITS TO SOME DIZZINESS AT TIMES WITH POSITION CHANGES. STATES IT MAY BE VERTIGO.,  HX OF PARKINSONS.   Hematological:  Negative for adenopathy.   Psychiatric/Behavioral:  Negative for confusion, decreased concentration and depression. The patient is not nervous/anxious.         ADMITS TO AGE RELATED MEMORY LOSS.         Infusion Readiness:  - Assessment Concerns Related to Infusion: No  - Provider notified: n/a      New Patient Education:    N/A (returning patient for continuation of therapy. Ongoing education provided as needed.)        Treatment Conditions & Drug Specific Questions:    Ocrelizumab  (OCREVUS)    (Unless otherwise specified on patient specific therapy plan):     TREATMENT CONDITIONS:  Unless otherwise specified on patient specific " "therapy plan HOLD and notify provider prior to proceeding with today's infusion if patient with:  o Positive Hepatitis B Surface Ag or panel  o Positive T-Spot    Lab Results   Component Value Date    TBSIN Negative 08/22/2019      Lab Results   Component Value Date    HEPBSAG NONREACTIVE 08/22/2019      Lab Results   Component Value Date    HEPBCAB NONREACTIVE 08/22/2019    HEPCAB NONREACTIVE 03/03/2021      No results found for: \"NONUHFIRE\", \"NONUHSWGH\", \"NONUHFISH\", \"EXTHEPBSAG\"    Patient meets treatment conditions? Yes    DRUG SPECIFIC QUESTIONS:  Any signs or symptoms of Progressive multifocal leukoencephalopathy (PML) which may include: memory loss, trouble thinking, dizziness, loss of balance, difficulty talking / walking or loss of vision? No    (If YES HOLD and notify provider)      REMINDER:  Recommended Vitals/Observation:  Vitals: Obtain vital signs every 30 minutes / with each ramp up. Once maximum rate is reached obtain vitals hourly until infusion is complete. Obtain vitals at end of observation period and PRN.  Observation: Observe patient for 60 minutes after each infusion. Observation complete.          Weight Based Drug Calculations:    WEIGHT BASED DRUGS: NOT APPLICABLE / FLAT DOSE       Post Treatment: Patient tolerated treatment without issue and was discharged in no apparent distress.      Note Authored / Patient Cared for By: Theresa Borja RN        "

## 2025-05-12 NOTE — PATIENT INSTRUCTIONS
Today :We administered acetaminophen, methylPREDNISolone sod succinate, diphenhydrAMINE, and ocrelizumab (Ocrevus) 600 mg in sodium chloride 0.9% 520 mL IV.     For:   1. Drug-induced immunodeficiency (Multi)    2. Multiple sclerosis (Multi)         Your next appointment is due in: 6 MONTHS      Please read the  Medication Guide that was given to you and reviewed during todays visit.     (Tell all doctors including dentists that you are taking this medication)     Go to the emergency room or call 911 if:  -You have signs of allergic reaction:   -Rash, hives, itching.   -Swollen, blistered, peeling skin.   -Swelling of face, lips, mouth, tongue or throat.   -Tightness of chest, trouble breathing, swallowing or talking     Call your doctor:  - If IV / injection site gets red, warm, swollen, itchy or leaks fluid or pus.     (Leave dressing on your IV site for at least 2 hours and keep area clean and dry  - If you get sick or have symptoms of infection or are not feeling well for any reason.    (Wash your hands often, stay away from people who are sick)  - If you have side effects from your medication that do not go away or are bothersome.     (Refer to the teaching your nurse gave you for side effects to call your doctor about)    - Common side effects may include:  stuffy nose, headache, feeling tired, muscle aches, upset stomach  - Before receiving any vaccines     - Call [facility] at   If:  - You get sick, are on antibiotics, have had a recent vaccine, have surgery or dental work and your doctor wants your visit rescheduled.  - You need to cancel and reschedule your visit for any reason. Call at least 2 days before your visit if you need to cancel.   - Your insurance changes before your next visit.    (We will need to get approval from your new insurance. This can take up to two weeks.)     The [facility] is opened Monday thru Friday. We are closed on weekends and holidays.    Voice mail will take your call if the center is closed. If you leave a message please allow 24 hours for a call back during weekdays. If you leave a message on a weekend/holiday, we will call you back the next business day.    A pharmacist is available Monday - Friday from 8:30AM to 3:30PM to help answer any questions you may have about your prescriptions(s). Please call pharmacy at:    [facility]: [phone]  [facility]: [phone]  [facility]: [phone]

## 2025-05-15 LAB
CD19 CELLS # BLD: 0.07 X10E9/L (ref 0.07–0.91)
CD19 CELLS NFR BLD: 3 % (ref 6–19)
CD19+CD24++CD38++%: 44.5 % (ref 1–3.6)
CD19+CD24-CD38++%: 0.3 % (ref 0.6–1.6)
CD19+CD27+IGD+%: 0.6 % (ref 13.4–21.4)
CD19+CD27+IGD-%: 1 % (ref 9.2–18.9)
CD19+CD27-IGD+%: 97.3 % (ref 58–72.1)
FLOW CYTOMETRY SPECIALIST REVIEW: ABNORMAL
LYMPHOCYTES # SPEC AUTO: 2.25 X10*3/UL
PATH REVIEW, B CELL PHENOTYPING, EXTENDED: ABNORMAL

## 2025-05-29 ENCOUNTER — OFFICE VISIT (OUTPATIENT)
Dept: NEUROLOGY | Facility: CLINIC | Age: 80
End: 2025-05-29
Payer: MEDICARE

## 2025-05-29 VITALS
SYSTOLIC BLOOD PRESSURE: 122 MMHG | DIASTOLIC BLOOD PRESSURE: 63 MMHG | BODY MASS INDEX: 23.88 KG/M2 | HEART RATE: 63 BPM | RESPIRATION RATE: 18 BRPM | WEIGHT: 186 LBS

## 2025-05-29 DIAGNOSIS — G35 MULTIPLE SCLEROSIS (MULTI): ICD-10-CM

## 2025-05-29 DIAGNOSIS — G11.9 CEREBELLAR ATAXIA (MULTI): ICD-10-CM

## 2025-05-29 DIAGNOSIS — G82.20 PARAPARESIS (MULTI): ICD-10-CM

## 2025-05-29 DIAGNOSIS — G20.A1 PARKINSON'S DISEASE WITHOUT DYSKINESIA OR FLUCTUATING MANIFESTATIONS: Primary | ICD-10-CM

## 2025-05-29 PROCEDURE — 99214 OFFICE O/P EST MOD 30 MIN: CPT | Performed by: NURSE PRACTITIONER

## 2025-05-29 PROCEDURE — 1126F AMNT PAIN NOTED NONE PRSNT: CPT | Performed by: NURSE PRACTITIONER

## 2025-05-29 PROCEDURE — 1159F MED LIST DOCD IN RCRD: CPT | Performed by: NURSE PRACTITIONER

## 2025-05-29 PROCEDURE — 3078F DIAST BP <80 MM HG: CPT | Performed by: NURSE PRACTITIONER

## 2025-05-29 PROCEDURE — 3074F SYST BP LT 130 MM HG: CPT | Performed by: NURSE PRACTITIONER

## 2025-05-29 PROCEDURE — 1160F RVW MEDS BY RX/DR IN RCRD: CPT | Performed by: NURSE PRACTITIONER

## 2025-05-29 RX ORDER — RIVASTIGMINE TARTRATE 1.5 MG/1
1.5 CAPSULE ORAL 2 TIMES DAILY
Qty: 60 CAPSULE | Refills: 11 | Status: SHIPPED | OUTPATIENT
Start: 2025-05-29 | End: 2025-06-03 | Stop reason: SDUPTHER

## 2025-05-29 ASSESSMENT — PAIN SCALES - GENERAL: PAINLEVEL_OUTOF10: 0-NO PAIN

## 2025-05-29 NOTE — PROGRESS NOTES
Patient name: JOHN WISE   YOB: 1945   PCP: none      Diagnosis if known: SPMS + parkinsonism   Date of onset: 2015  Date of diagnosis: 08/22/2019  disease course at onset: relapsing  disease course now: progressive without relapses   Current DMT: Ocrevus as of 9/2019  Previous DMTs: none   Last MRI brain: 9/2024  Last MRI cervical: 6/2019  Last MRI thoracic: 6/2019  Last OCT: not done  CSF: cells normal, P 53, positive OCBs  JCV: negative, index 0.2, August 2019  VZV: positive 8/2019  HEP panel: negative 8/2019  NMO: negative July 2019  MOG: negative July 2019    Subjective   John Wise is a 79 y.o. right-handed male here for a follow up of his MS, he gets Ocrevus infusions, and tolerating well. Last MRI of the brain was Sept 2024 and stable. He reports no new MS symptoms.    ROS  Frequent urination, no bowel issues, uses w/c to ambulate, no recent infection, and no trouble swallowing.    Objective   Neurological Exam  Physical Exam    Provider Impression    I personally reviewed laboratory, radiographic, and medical studies which were pertinent for today's visit.    Plan  - Continue Ocrevus.  - Labs at Infusion.  - MRI Sept. 2025.  - Follow up 6 months.         4+  Knee extension                      4-                          4+    Sensory  Light touch is normal in upper and lower extremities.     Reflexes                                            Right                      Left  Brachioradialis                    2+                         3+  Biceps                                 2+                         3+  Triceps                                2+                         2+  Patellar                                Tr                         Tr   Right palmar grasp present. Left palmar grasp present.    Coordination  Right: Finger-to-nose normal. Rapid alternating movement normal. Was able to perm but had issues with direction, due to sight..Left: Finger-to-nose normal. Rapid alternating movement normal.  Not able to perform peg test..    Gait    Uses w/c to ambulate..    Physical Exam  Neurological:      Mental Status: He is alert.      Deep Tendon Reflexes:      Reflex Scores:       Tricep reflexes are 2+ on the right side and 2+ on the left side.       Bicep reflexes are 2+ on the right side and 3+ on the left side.       Brachioradialis reflexes are 2+ on the right side and 3+ on the left side.  Psychiatric:         Speech: Speech normal.         Provider Impression  John Peterson is a 79 y.o. right-handed male here for a follow up of his MS, he gets Ocrevus infusions, and tolerating well. Last MRI of the brain was Sept 2024 and stable.     We discussed the importance of living healthy life style with diet and exercise and the importance of V-D in patient's with MS.    The total face to face appointment was 30 minutes and more than 50% of the visit was spent counseling and coordination of care.    I personally reviewed laboratory, radiographic, and medical studies which were pertinent for today's visit.    Plan  - Continue Ocrevus.  - Labs at Infusion.  - MRI Sept. 2025.  - Follow up 6 months.

## 2025-06-02 DIAGNOSIS — G35 MULTIPLE SCLEROSIS (MULTI): ICD-10-CM

## 2025-06-03 PROBLEM — G11.9 CEREBELLAR ATAXIA (MULTI): Status: ACTIVE | Noted: 2025-06-03

## 2025-06-03 PROBLEM — G82.20 PARAPARESIS (MULTI): Status: ACTIVE | Noted: 2025-06-03

## 2025-06-03 RX ORDER — CARBIDOPA AND LEVODOPA 25; 100 MG/1; MG/1
1.5 TABLET ORAL 3 TIMES DAILY
Qty: 135 TABLET | Refills: 11 | Status: SHIPPED | OUTPATIENT
Start: 2025-06-03 | End: 2026-06-03

## 2025-06-03 RX ORDER — RIVASTIGMINE TARTRATE 1.5 MG/1
1.5 CAPSULE ORAL 2 TIMES DAILY
Qty: 60 CAPSULE | Refills: 11 | Status: SHIPPED | OUTPATIENT
Start: 2025-06-03 | End: 2026-06-03

## 2025-06-04 RX ORDER — VIT C/E/ZN/COPPR/LUTEIN/ZEAXAN 250MG-90MG
125 CAPSULE ORAL DAILY
Qty: 90 CAPSULE | Refills: 3 | Status: SHIPPED | OUTPATIENT
Start: 2025-06-04

## 2025-10-27 ENCOUNTER — APPOINTMENT (OUTPATIENT)
Dept: INFUSION THERAPY | Facility: CLINIC | Age: 80
End: 2025-10-27
Payer: MEDICARE

## 2025-11-12 ENCOUNTER — APPOINTMENT (OUTPATIENT)
Dept: INFUSION THERAPY | Facility: CLINIC | Age: 80
End: 2025-11-12
Payer: MEDICARE